# Patient Record
Sex: FEMALE | Race: AMERICAN INDIAN OR ALASKA NATIVE
[De-identification: names, ages, dates, MRNs, and addresses within clinical notes are randomized per-mention and may not be internally consistent; named-entity substitution may affect disease eponyms.]

---

## 2019-02-19 ENCOUNTER — HOSPITAL ENCOUNTER (OUTPATIENT)
Dept: HOSPITAL 5 - GIO | Age: 49
Discharge: HOME | End: 2019-02-19
Attending: INTERNAL MEDICINE
Payer: COMMERCIAL

## 2019-02-19 VITALS — DIASTOLIC BLOOD PRESSURE: 69 MMHG | SYSTOLIC BLOOD PRESSURE: 123 MMHG

## 2019-02-19 DIAGNOSIS — I10: ICD-10-CM

## 2019-02-19 DIAGNOSIS — Z86.2: ICD-10-CM

## 2019-02-19 DIAGNOSIS — Z86.010: ICD-10-CM

## 2019-02-19 DIAGNOSIS — Z88.0: ICD-10-CM

## 2019-02-19 DIAGNOSIS — Z90.710: ICD-10-CM

## 2019-02-19 DIAGNOSIS — K21.9: ICD-10-CM

## 2019-02-19 DIAGNOSIS — Z88.5: ICD-10-CM

## 2019-02-19 DIAGNOSIS — Z98.51: ICD-10-CM

## 2019-02-19 DIAGNOSIS — Z98.890: ICD-10-CM

## 2019-02-19 DIAGNOSIS — Z79.899: ICD-10-CM

## 2019-02-19 DIAGNOSIS — Z12.11: Primary | ICD-10-CM

## 2019-02-19 DIAGNOSIS — Z98.891: ICD-10-CM

## 2019-02-19 DIAGNOSIS — Z72.89: ICD-10-CM

## 2019-02-19 PROCEDURE — 45378 DIAGNOSTIC COLONOSCOPY: CPT

## 2019-02-19 NOTE — SHORT STAY SUMMARY
Short Stay Documentation


Date of service: 02/19/19


Narrative H&P: 





The patient presents for screening colonoscopy, FH positive for a first degree 

relative with colon polyps.  No prior studies.





- History


Past Medical History: hypertension


Past Surgical History: hysterectomy


Social history: no significant social history, , no smoking, no alcohol 

abuse





- Allergies and Medications


Current Medications: 


                                    Allergies





codeine Allergy (Verified 07/24/14 15:03)


   nausea and vomiting


Penicillins Allergy (Verified 07/24/14 15:03)


   rash, swelling, itching





                                Home Medications











 Medication  Instructions  Recorded  Confirmed  Last Taken  Type


 


Cinnamon 500 mg PO DAILY 02/18/19 02/18/19 Unknown History


 


Fish Oil 1 tab PO DAILY 02/18/19 02/18/19 Unknown History


 


Triamter/Hctz 37.5-25 mg 1 tab PO DAILY 02/18/19 02/19/19 02/19/19 History








Active Medications





Sodium Chloride (Nacl 0.9% 1000 Ml)  1,000 mls @ 50 mls/hr IV AS DIRECT LUCY


   Last Admin: 02/19/19 07:58 Dose:  50 mls/hr


   Documented by: 











- Physical exam


General appearance: no acute distress, well-nourished


Integumentary: no rash, no growths, no abnormal pigmentation


HEENT: Atraumatic, PERRLA, EOMI, Mucous membr. moist/pink


Lungs: Clear to auscultation, Normal air movement


Breasts: deferred


Heart: Regular rate, Normal S1, Normal S2, No murmurs


Gastrointestinal: normoactive bowel sounds, no tenderness, no distended, no 

masses, no guarding, no organomegaly


Female Genitourinary: deferred


Rectal Exam: normal exam-external/orifice, normal rectal tone, no mass


Extremities: no ischemia, pulses intact, pulses symmetrical, No edema, normal 

temperature, normal color, Full ROM


Neurological: Normal gait, Normal speech, Strength at 5/5 X4 ext, Normal tone, 

Sensation intact, Cranial nerves 3-12 NL





- Brief post op/procedure progress note


Date of procedure: 02/19/19


Findings: 





see dictated report


Estimated blood loss: none


Pathology: none


Condition: stable





- Disposition


Condition at discharge: Good


Disposition: DC-01 TO HOME OR SELFCARE





- Discharge Diagnoses


(1) Colon cancer screening


Status: Acute   





Short Stay Discharge Plan


Activity: other (no driving for 24 hours)


Weight Bearing Status: Weight Bear as Tolerated


Diet: regular


Follow up with: 


SOY AGUSTIN JR, MD [Primary Care Provider] - 7 Days

## 2019-02-19 NOTE — OPERATIVE REPORT
Operative Report


Operative Report: 





Date of procedure: 02/19/2019





Preprocedure diagnosis: History of colon polyps and a first-degree member.  No 

prior studies.





Post procedure diagnosis: Normal colon to the cecum





Procedure: Colonoscopy to the cecum





Endoscopist: Dr. Munoz





Anesthesia: Monitored anesthesia care per anesthesia department





Estimated blood loss: 0





Medications: Monitored anesthesia care.  See separate report by anesthesia for 

details.





After careful discussion of the nature and purpose of the procedure as well as 

details of the technique risks benefits and alternatives the patient gave 

consent.  Please see recent history and physical from the office.  The patient 

was placed in the left lateral decubitus position and medicated per anesthesia. 

A rectal exam was performed sphincter tone was normal there were no masses 

palpable.





The KeTechn 570 scope was passed transanally and advanced under continuous 

direct vision without difficulty to the cecum.  The colon was well prepared.  

The cecum was normal.  The ascending colon was normal and on forward and 

retroflexed views.  The transverse colon, descending colon, and sigmoid colon 

were normal.  The rectum was normal on forward and retroflexed views.  The 

procedure was well-tolerated overall and the patient was observed in recovery.





Conclusions: Normal colonoscopy to the cecum.





Plan: Repeat colonoscopy in 5 years based on family history.








Signed electronically: Kirill Munoz M.D.

## 2019-02-19 NOTE — ANESTHESIA CONSULTATION
Anesthesia Consult and Med Hx


Date of service: 02/19/19





- Airway


Anesthetic Teeth Evaluation: Good, Caps, Crowns


ROM Head & Neck: Adequate


Mental/Hyoid Distance: Adequate


Mallampati Class: Class I


Intubation Access Assessment: Good





- Pulmonary Exam


CTA: Yes





- Cardiac Exam


Cardiac Exam: RRR





- Pre-Operative Health Status


ASA Pre-Surgery Classification: ASA2


Proposed Anesthetic Plan: TIVA





- Pulmonary


Hx Smoking: No


Hx Asthma: No


Hx Respiratory Symptoms: No


SOB: No


COPD: No


Home Oxygen Therapy: No


Hx Pneumonia: No


Hx Sleep Apnea: No





- Cardiovascular System


Hx Hypertension: Yes (on medications, took meds today)


Hx Coronary Artery Disease: No


Hx Heart Attack/AMI: No


Hx Angina: No


Hx Percutaneous Transluminal Coronary Angioplasty (PTCA): No


Hx Cardia Arrhythmia: No


Hx Pacemaker: No


Hx Internal Defibrillator: No


Hx Valvular Heart Disease: No


Hx Heart Murmur: No


Hx Peripheral Vascular Disease: No





- Central Nervous System


Hx Neuromuscular Disorder: No


Hx Seizures: No


CVA: No


Hx Back Pain: No


Hx Psychiatric Problems: No





- Gastrointestinal


Hx Ulcer: No


Hx Gastroesophageal Reflux Disease: No





- Endocrine


Hx Renal Disease: No


Hx End Stage Renal Disease: No


Hx Cirrhosis: No


Hx Liver Disease: No


Hx Insulin Dependent Diabetes: No


Hx Non-Insulin Dependent Diabetes: No


Hx Thyroid Disease: No


Hx Hypothyroidism: No


Hx Hyperthyroidism: No





- Hematic


Hx Anemia: Yes (resolved)


Hx Sickle Cell Disease: No





- Other Systems


Hx Alcohol Use: Yes (2-4 drinks per month)


Hx Substance Use: No


Hx Cancer: No


Hx Obesity: No

## 2019-02-19 NOTE — ANESTHESIA DAY OF SURGERY
Anesthesia Day of Surgery





- Day of Surgery


Patient Examined: Yes


Patient H&P Reviewed: Yes


Patient is NPO: Yes


Beta Blockers: No


Cardiac Clearance: No


Pulmonary Clearance: No


Toni's Test: Negative

## 2020-10-13 ENCOUNTER — HOSPITAL ENCOUNTER (OUTPATIENT)
Dept: HOSPITAL 5 - MAMMO | Age: 50
Discharge: HOME | End: 2020-10-13
Attending: FAMILY MEDICINE
Payer: COMMERCIAL

## 2020-10-13 DIAGNOSIS — R92.8: Primary | ICD-10-CM

## 2020-10-13 PROCEDURE — 77066 DX MAMMO INCL CAD BI: CPT

## 2020-10-13 NOTE — MAMMOGRAPHY REPORT
BILATERAL DIGITAL DIAGNOSTIC MAMMOGRAM WITH CAD 10/13/2020

RIGHT LIMITED BREAST ULTRASOUND

 

INDICATION: The patient reports a lump in the right breast for 3 months.



TECHNIQUE:  Digital bilateral mammographic imaging was performed. Spot compression views were obtaine
d. Limited ultrasound was performed. This examination was interpreted with the benefit of Computer-
ded Detection (CAD) analysis. 



COMPARISON: Screening mammogram, 1/30/2013



FINDINGS: 



Breast Density: The breasts are heterogeneously dense, which may obscure small masses.



MAMMOGRAPHIC FINDINGS:



Right breast: There is a round mass with indistinct margins and associated calcifications in the righ
t breast at the 1:00 position posterior depth. This measures approximately 2.3 cm. This corresponds t
o the patient's area of palpable concern.



Left breast: There is no evidence of dominant mass, suspicious calcifications or architectural distor
tion in the left breast.



ULTRASOUND FINDINGS: Targeted ultrasound evaluation was performed of the area of interest.   Sonograp
hic evaluation of the right breast at the 1:00 position in the patient's area of palpable concern dem
onstrates an irregular hypoechoic solid mass measuring 2.5 x 1.6 cm. There is no significant associat
ed vascularity. This corresponds to the mammographic findings and to the patient's area of clinical c
oncern.



Sonographic evaluation of the right axillary region demonstrates no pathologically enlarged axillary 
lymph nodes.



IMPRESSION:



Follow up recommendation: Surgical consult



BI-RADS Category 5: Highly Suggestive of Malignancy.  The palpable right breast mass at the 1:00 posi
tion is highly suggestive of malignancy. Surgical consultation and ultrasound-guided biopsy is recomm
ended.





A "normal" or negative report should not discourage follow up or biopsy of a clinically significant f
inding.



A written summary of these findings will be mailed to the patient. The patient will be entered into a
 mammography reporting system which will generate a reminder letter for the patient's next appointmen
t at the appropriate interval.



According to the [employer], yearly mammograms are recommended starting at age 40 
and continuing as long as a woman is in good health.  Breast MRI is recommended for women with an david
roximately 20-25% or greater lifetime risk of breast cancer, including women with a strong family his
tory of breast or ovarian cancer and women who have been treated for Hodgkin's disease.



Signer Name: Latha Alcala MD 

Signed: 10/13/2020 4:23 PM

Workstation Name: [id number]

## 2020-10-13 NOTE — ULTRASOUND REPORT
BILATERAL DIGITAL DIAGNOSTIC MAMMOGRAM WITH CAD 10/13/2020

RIGHT LIMITED BREAST ULTRASOUND

 

INDICATION: The patient reports a lump in the right breast for 3 months.



TECHNIQUE:  Digital bilateral mammographic imaging was performed. Spot compression views were obtaine
d. Limited ultrasound was performed. This examination was interpreted with the benefit of Computer-
ded Detection (CAD) analysis. 



COMPARISON: Screening mammogram, 1/30/2013



FINDINGS: 



Breast Density: The breasts are heterogeneously dense, which may obscure small masses.



MAMMOGRAPHIC FINDINGS:



Right breast: There is a round mass with indistinct margins and associated calcifications in the righ
t breast at the 1:00 position posterior depth. This measures approximately 2.3 cm. This corresponds t
o the patient's area of palpable concern.



Left breast: There is no evidence of dominant mass, suspicious calcifications or architectural distor
tion in the left breast.



ULTRASOUND FINDINGS: Targeted ultrasound evaluation was performed of the area of interest.   Sonograp
hic evaluation of the right breast at the 1:00 position in the patient's area of palpable concern dem
onstrates an irregular hypoechoic solid mass measuring 2.5 x 1.6 cm. There is no significant associat
ed vascularity. This corresponds to the mammographic findings and to the patient's area of clinical c
oncern.



Sonographic evaluation of the right axillary region demonstrates no pathologically enlarged axillary 
lymph nodes.



IMPRESSION:



Follow up recommendation: Surgical consult



BI-RADS Category 5: Highly Suggestive of Malignancy.  The palpable right breast mass at the 1:00 posi
tion is highly suggestive of malignancy. Surgical consultation and ultrasound-guided biopsy is recomm
ended.





A "normal" or negative report should not discourage follow up or biopsy of a clinically significant f
inding.



A written summary of these findings will be mailed to the patient. The patient will be entered into a
 mammography reporting system which will generate a reminder letter for the patient's next appointmen
t at the appropriate interval.



According to the [employer], yearly mammograms are recommended starting at age 40 
and continuing as long as a woman is in good health.  Breast MRI is recommended for women with an david
roximately 20-25% or greater lifetime risk of breast cancer, including women with a strong family his
tory of breast or ovarian cancer and women who have been treated for Hodgkin's disease.



Signer Name: Latha Alcala MD 

Signed: 10/13/2020 4:23 PM

Workstation Name: [id number]

## 2020-11-04 ENCOUNTER — HOSPITAL ENCOUNTER (OUTPATIENT)
Dept: HOSPITAL 5 - LAB | Age: 50
Discharge: HOME | End: 2020-11-04
Attending: FAMILY MEDICINE
Payer: COMMERCIAL

## 2020-11-04 DIAGNOSIS — C50.211: Primary | ICD-10-CM

## 2020-11-04 LAB
ALBUMIN SERPL-MCNC: 4.5 G/DL (ref 3.9–5)
ALT SERPL-CCNC: 25 UNITS/L (ref 7–56)
BACTERIA #/AREA URNS HPF: (no result) /HPF
BASOPHILS # (AUTO): 0 K/MM3 (ref 0–0.1)
BASOPHILS NFR BLD AUTO: 0.9 % (ref 0–1.8)
BILIRUB UR QL STRIP: (no result)
BLOOD UR QL VISUAL: (no result)
BUN SERPL-MCNC: 14 MG/DL (ref 7–17)
BUN/CREAT SERPL: 16 %
CALCIUM SERPL-MCNC: 9.5 MG/DL (ref 8.4–10.2)
EOSINOPHIL # BLD AUTO: 0.1 K/MM3 (ref 0–0.4)
EOSINOPHIL NFR BLD AUTO: 1.3 % (ref 0–4.3)
HCT VFR BLD CALC: 38.8 % (ref 30.3–42.9)
HDLC SERPL-MCNC: 69 MG/DL (ref 40–59)
HEMOLYSIS INDEX: 4
HGB BLD-MCNC: 12.9 GM/DL (ref 10.1–14.3)
LYMPHOCYTES # BLD AUTO: 1.4 K/MM3 (ref 1.2–5.4)
LYMPHOCYTES NFR BLD AUTO: 33.5 % (ref 13.4–35)
MCHC RBC AUTO-ENTMCNC: 33 % (ref 30–34)
MCV RBC AUTO: 88 FL (ref 79–97)
MONOCYTES # (AUTO): 0.3 K/MM3 (ref 0–0.8)
MONOCYTES % (AUTO): 7 % (ref 0–7.3)
MUCOUS THREADS #/AREA URNS HPF: (no result) /HPF
PH UR STRIP: 7 [PH] (ref 5–7)
PLATELET # BLD: 228 K/MM3 (ref 140–440)
PROT UR STRIP-MCNC: (no result) MG/DL
RBC # BLD AUTO: 4.4 M/MM3 (ref 3.65–5.03)
RBC #/AREA URNS HPF: 6 /HPF (ref 0–6)
URATE SERPL-MCNC: 5.6 MG/DL (ref 3.5–7.6)
UROBILINOGEN UR-MCNC: < 2 MG/DL (ref ?–2)
WBC #/AREA URNS HPF: 2 /HPF (ref 0–6)

## 2020-11-04 PROCEDURE — 83735 ASSAY OF MAGNESIUM: CPT

## 2020-11-04 PROCEDURE — 84100 ASSAY OF PHOSPHORUS: CPT

## 2020-11-04 PROCEDURE — 82607 VITAMIN B-12: CPT

## 2020-11-04 PROCEDURE — 85025 COMPLETE CBC W/AUTO DIFF WBC: CPT

## 2020-11-04 PROCEDURE — 80053 COMPREHEN METABOLIC PANEL: CPT

## 2020-11-04 PROCEDURE — 84550 ASSAY OF BLOOD/URIC ACID: CPT

## 2020-11-04 PROCEDURE — 82306 VITAMIN D 25 HYDROXY: CPT

## 2020-11-04 PROCEDURE — 80061 LIPID PANEL: CPT

## 2020-11-04 PROCEDURE — 36415 COLL VENOUS BLD VENIPUNCTURE: CPT

## 2020-11-04 PROCEDURE — 83036 HEMOGLOBIN GLYCOSYLATED A1C: CPT

## 2020-11-04 PROCEDURE — 81001 URINALYSIS AUTO W/SCOPE: CPT

## 2020-11-06 ENCOUNTER — HOSPITAL ENCOUNTER (OUTPATIENT)
Dept: HOSPITAL 5 - SPVWC | Age: 50
Discharge: HOME | End: 2020-11-06
Attending: SURGERY
Payer: COMMERCIAL

## 2020-11-06 ENCOUNTER — HOSPITAL ENCOUNTER (OUTPATIENT)
Dept: HOSPITAL 5 - LABHHL | Age: 50
Discharge: HOME | End: 2020-11-06
Attending: SURGERY
Payer: COMMERCIAL

## 2020-11-06 DIAGNOSIS — R92.8: Primary | ICD-10-CM

## 2020-11-06 DIAGNOSIS — N63.11: Primary | ICD-10-CM

## 2020-11-06 PROCEDURE — 88341 IMHCHEM/IMCYTCHM EA ADD ANTB: CPT

## 2020-11-06 PROCEDURE — 88305 TISSUE EXAM BY PATHOLOGIST: CPT

## 2020-11-06 PROCEDURE — 88342 IMHCHEM/IMCYTCHM 1ST ANTB: CPT

## 2020-11-06 NOTE — MAMMOGRAPHY REPORT
DIGITAL DIAGNOSTIC MAMMOGRAM WITH CAD CONVENTIONAL, 11/6/2020



CLINICAL INFORMATION / INDICATION: DOCUMENT RT BRST CLIP



TECHNIQUE:  Digital right mammographic imaging was performed.

This examination was interpreted with the benefit of Computer-aided Detection analysis. 



COMPARISON: 10/13/2020



FINDINGS: 



Breast Density: The breasts are heterogeneously dense, which may obscure small masses.



Mass in the right breast is again noted. Biopsy marking clip is located approximately 1 cm anterior t
o the mass.







IMPRESSION: Biopsy marking clip is located approximately 1 cm anterior to the mass in the 1:00 positi
on of the right breast.







Post biopsy imaging.



-------------------------------------------------------------------------------------------

A "normal" or negative report should not discourage follow up or biopsy of a clinically significant f
inding.



A written summary of these findings will be mailed to the patient. The patient will be entered into a
 mammography reporting system which will generate a reminder letter for the patient's next appointmen
t at the appropriate interval.



According to the [employer], yearly mammograms are recommended starting at age 40 
and continuing as long as a woman is in good health.  Breast MRI is recommended for women with an david
roximately 20-25% or greater lifetime risk of breast cancer, including women with a strong family his
tory of breast or ovarian cancer and women who have been treated for Hodgkin's disease.



Signer Name: Rogelio Claros MD 

Signed: 11/6/2020 11:20 AM

Workstation Name: [email or web address]

## 2020-11-08 LAB — VITAMIN D2 SERPL-MCNC: <4 NG/ML

## 2020-11-25 ENCOUNTER — HOSPITAL ENCOUNTER (OUTPATIENT)
Dept: HOSPITAL 5 - SPVIMAG | Age: 50
Discharge: HOME | End: 2020-11-25
Attending: SURGERY
Payer: COMMERCIAL

## 2020-11-25 DIAGNOSIS — K76.9: ICD-10-CM

## 2020-11-25 DIAGNOSIS — C50.211: Primary | ICD-10-CM

## 2020-11-25 PROCEDURE — 77049 MRI BREAST C-+ W/CAD BI: CPT

## 2020-11-25 PROCEDURE — C8908 MRI W/O FOL W/CONT, BREAST,: HCPCS

## 2020-11-25 PROCEDURE — A9577 INJ MULTIHANCE: HCPCS

## 2020-11-25 NOTE — MAGNETIC RESONANCE REPORT
Bilateral breast MRI with and without contrast.



History:  New diagnosis right breast cancer



Procedure: Axial T1 and T2-weighted fat-sat images were obtained precontrast.  17 cc MultiHance was i
njected intravenously and serial axial T1-weighted images with fat saturation were obtained postcontr
ast. 3-D MIP projections, Kinetic analysis and subtraction imaging was utilized to evaluate. A Ridejoy 8 channel breast coil was utilized for image acquisition.



Comparison: Right breast ultrasound 10/13/2020, right mammogram 11/6/2020, bilateral mammogram 10/13/
2020



Findings: Background level of enhancement is marked.



No suspicious axillary or clavicular nodes are identified. No abnormal bone marrow signal is seen. No
 significant chest wall enhancement is noted. 



On coronal STIR series in the upper medial posterior aspect of the right lobe of the liver, a mildly 
irregular moderately increased signal 17 mm lesion is seen which could be a cyst but is considered in
determinate. This is not visible on axial imaging.



Right breast: The known malignant mass in the upper posterior right breast at approximately 12:30 is 
seen with prominently abnormal enhancement. This mass measures 2.9 cm in greatest diameter. The lesio
n is approximately 7.2 cm from the nipple, 1.8 cm from the medial skin surface, and 2.1 cm from the c
hest wall. The dense surrounding breast tissue in the mid to posterior upper medial left breast shows
 prominent proliferative change. Heterogenous kinetics are seen and in this background would be diffi
cult to exclude additional neoplasia. This region measures in AP diameter of approximate 7 cm and ext
ends from the anterior to mid breast nearly to the chest wall. Similar appearance is seen but to a mu
ch lesser degree laterally in the upper mid to posterior right breast within the dense breast tissue.
 This is more pronounced though somewhat similar to analogous tissue on the left. Moderate stippled e
nhancement is seen in other areas. In the lower outer right breast a band of enhancing tissue is seen
 similar to findings more superiorly with mildly heterogenous kinetics.



Left breast: Benign-appearing nodule with a biopsy clip is seen mammographically is noted in the ante
rior upper medial left breast with only slight enhancement. Prominent proliferative changes are seen 
with prominent stippled enhancement throughout. In the upper outer mid to posterior left breast a ban
d of tissue shows relatively more enhancement with mild washout which is diffuse and similar appearan
ce to several areas in the outer portion of the right breast.





Impression: 

1. Known malignant mass is seen in the right breast

2. Marked bilateral proliferative changes make evaluation of the remainder of the breast tissue extre
rolo difficult in this patient. This appears as both diffuse generalized tissue enhancement as well a
s stippled enhancement, the latter more prominent on the left than right. In the right breast the den
se tissue surrounding the known malignant mass shows relatively more enhancement including heterogeno
us kinetics but there are similar but smaller areas seen in several regions of the lateral aspect of 
the right breast as well as the upper outer posterior portion of the left breast. It would be difficu
lt to exclude neoplastic type activity in these areas though I believe this is more likely part of a 
diffuse proliferative process which is particularly prominent in this patient.

3. Indeterminate lesion in the liver. Ultrasound may be useful.



BIRADS: 6: Known diagnosis breast cancer



Signer Name: Moo Benoit MD 

Signed: 11/25/2020 4:32 PM

Workstation Name: [id number]

## 2020-12-07 ENCOUNTER — HOSPITAL ENCOUNTER (OUTPATIENT)
Dept: HOSPITAL 5 - US | Age: 50
Discharge: HOME | End: 2020-12-07
Attending: SURGERY
Payer: COMMERCIAL

## 2020-12-07 DIAGNOSIS — C50.211: Primary | ICD-10-CM

## 2020-12-07 DIAGNOSIS — N63.42: ICD-10-CM

## 2020-12-07 DIAGNOSIS — K76.89: ICD-10-CM

## 2020-12-07 PROCEDURE — 76700 US EXAM ABDOM COMPLETE: CPT

## 2020-12-07 NOTE — ULTRASOUND REPORT
ULTRASOUND BREAST BILATERAL COMPLETE, 12/7/2020



CLINICAL INFORMATION / INDICATION: Patient recently diagnosed with right breast malignancy. Recent MR
I showed bilateral abnormal MRI findings..



TECHNIQUE: Complete sonographic evaluation of all 4 quadrants and retroareolar region was performed. 
 



COMPARISON: Recent breast MRI 11/25/2020, prior mammogram 10/13/2020 and prior right breast ultrasoun
d 10/13/2020



FINDINGS: 

Left breast: Sonographic evaluation the entire left breast demonstrates a hypoechoic mass measuring 1
3 mm at 12:00. This mass contains a biopsy clip and has been previously biopsied. The remainder of th
e left breast is unremarkable.



Right breast: In the 12-1:00 position of the right breast, there is an irregular solid hypoechoic mas
s measuring 3.5 x 1.9 x 3.2 cm. This is the patient's biopsy-proven breast malignancy. This mass does
 appear to have increased in size slightly since prior ultrasound dated 10/13/2020.

No additional findings identified in the right breast.



IMPRESSION: 

1. Biopsy-proven right breast malignancy is again noted. There has been slight interval growth since 
10/13/2020 ultrasound exam.

2. Biopsy proven benign left breast mass at 12:00.

3. No other additional findings are seen within either breast.



Follow up recommendation: Continued surgical consultation for biopsy proven right breast malignancy.









-------------------------------------------------------------------------------------------

A normal or "negative" report should not preclude biopsy or follow-up of a clinically suspicious find
ing.



Signer Name: Mckenzie Baez MD 

Signed: 12/7/2020 4:48 PM

Workstation Name: [email or web address]

## 2020-12-07 NOTE — ULTRASOUND REPORT
ULTRASOUND ABDOMEN, COMPLETE



INDICATION:

ATTENTION TO LIVER.



COMPARISON:

No relevant prior imaging study available. 



FINDINGS:

Pancreas: No significant abnormality.

Abdominal Aorta: No significant abnormality. IVC: No significant abnormality.

Liver: The liver measures 12.8 cm in length.  There are 2 or 3 small cysts scattered throughout the l
iver with the largest measuring 1.1 cm. Normal hepatopedal blood flow in the main portal vein.

Gallbladder: No significant abnormality. 

Bile ducts: No significant abnormality. Common bile duct measures 3 mm. 

Kidneys: Right:  9.7 cm in length.  No significant abnormality. Left:  9.8 cm in length.  No signific
ant abnormality.

Spleen: No significant abnormality.



Free fluid: None.

Additional Findings: None.



IMPRESSION:

No significant abnormality. Few small liver cysts as described. 



Signer Name: Lakhwinder Sparrow Jr, MD 

Signed: 12/7/2020 2:45 PM

Workstation Name: [id number]

## 2020-12-16 ENCOUNTER — HOSPITAL ENCOUNTER (OUTPATIENT)
Dept: HOSPITAL 5 - SPVWC | Age: 50
Discharge: HOME | End: 2020-12-16
Attending: SURGERY
Payer: COMMERCIAL

## 2020-12-16 DIAGNOSIS — Z88.0: ICD-10-CM

## 2020-12-16 DIAGNOSIS — Z98.891: ICD-10-CM

## 2020-12-16 DIAGNOSIS — Z86.2: ICD-10-CM

## 2020-12-16 DIAGNOSIS — Z88.5: ICD-10-CM

## 2020-12-16 DIAGNOSIS — Z72.89: ICD-10-CM

## 2020-12-16 DIAGNOSIS — Z98.51: ICD-10-CM

## 2020-12-16 DIAGNOSIS — K21.9: ICD-10-CM

## 2020-12-16 DIAGNOSIS — I10: ICD-10-CM

## 2020-12-16 DIAGNOSIS — Z98.890: ICD-10-CM

## 2020-12-16 DIAGNOSIS — Z90.710: ICD-10-CM

## 2020-12-16 DIAGNOSIS — R59.9: Primary | ICD-10-CM

## 2020-12-16 DIAGNOSIS — Z79.899: ICD-10-CM

## 2020-12-16 PROCEDURE — 38505 NEEDLE BIOPSY LYMPH NODES: CPT

## 2020-12-16 PROCEDURE — 88305 TISSUE EXAM BY PATHOLOGIST: CPT

## 2020-12-16 PROCEDURE — 76942 ECHO GUIDE FOR BIOPSY: CPT

## 2020-12-16 NOTE — ULTRASOUND REPORT
ULTRASOUND-GUIDED CORE NEEDLE BIOPSY RIGHT AXILLA WITH CLIP PLACEMENT



INDICATION: Mildly abnormal right axillary lymph node in the setting of known right breast cancer.



FINDINGS:



Informed consent was obtained. The abnormal lymph node within the right axilla was identified with ul
trasound. The overlying skin was cleansed with chloro prep and local anesthesia was obtained with a 1
% lidocaine solution. Under ultrasound guidance a 14-gauge spring loaded core biopsy needle was advan
dylon to the lesion. A total of 4 core samples were obtained. A U-shaped biopsy marker was placed to ma
rk the site of the biopsy. Specimen samples were placed in formalin and sent to pathology for analysi
s.



Patient tolerated the procedure well and no immediate complications were identified. A post procedure
 mammogram was not obtained, but the biopsy marker appears appropriately positioned under ultrasound.




IMPRESSION: 



Technically successful ultrasound-guided core biopsy of right axillary lymph node with placement of a
 U-shaped biopsy marker.



An addendum will be added to this report once pathology results are available.



Signer Name: Jonathan Seo MD 

Signed: 12/16/2020 3:06 PM

Workstation Name: [id number]

## 2020-12-16 NOTE — ULTRASOUND REPORT
RIGHT AXILLARY ULTRASOUND

 

INDICATION: Patient with known biopsy-proven malignancy within the right breast. 



COMPARISON: 12/7/2020, 11/25/2020, 11/6/2020, 10/13/2020.



FINDINGS: A targeted ultrasound of the right axilla was performed. There is a lymph node which demons
trates mild cortical thickening measuring up to 3.8 mm. Preserved fatty hilum is noted.



IMPRESSION:



Mildly abnormal right axillary lymph node. Given the presence of known right breast malignancy, an ul
trasound guided biopsy of this lymph node is planned for later this same day.



BI-RADS Category 4: Suspicious for Malignancy. 







Signer Name: Jonathan Seo MD 

Signed: 12/16/2020 2:11 PM

Workstation Name: [id number]

## 2020-12-23 ENCOUNTER — HOSPITAL ENCOUNTER (OUTPATIENT)
Dept: HOSPITAL 5 - OR | Age: 50
Discharge: HOME | End: 2020-12-23
Attending: SURGERY
Payer: COMMERCIAL

## 2020-12-23 VITALS — SYSTOLIC BLOOD PRESSURE: 122 MMHG | DIASTOLIC BLOOD PRESSURE: 87 MMHG

## 2020-12-23 DIAGNOSIS — Z86.2: ICD-10-CM

## 2020-12-23 DIAGNOSIS — Z79.899: ICD-10-CM

## 2020-12-23 DIAGNOSIS — Z17.1: ICD-10-CM

## 2020-12-23 DIAGNOSIS — Z20.828: ICD-10-CM

## 2020-12-23 DIAGNOSIS — Z72.89: ICD-10-CM

## 2020-12-23 DIAGNOSIS — I89.8: ICD-10-CM

## 2020-12-23 DIAGNOSIS — I10: ICD-10-CM

## 2020-12-23 DIAGNOSIS — Z88.5: ICD-10-CM

## 2020-12-23 DIAGNOSIS — E78.00: ICD-10-CM

## 2020-12-23 DIAGNOSIS — Z98.51: ICD-10-CM

## 2020-12-23 DIAGNOSIS — C50.211: Primary | ICD-10-CM

## 2020-12-23 DIAGNOSIS — Z98.890: ICD-10-CM

## 2020-12-23 DIAGNOSIS — Z98.891: ICD-10-CM

## 2020-12-23 DIAGNOSIS — Z88.0: ICD-10-CM

## 2020-12-23 DIAGNOSIS — F41.9: ICD-10-CM

## 2020-12-23 DIAGNOSIS — E66.9: ICD-10-CM

## 2020-12-23 DIAGNOSIS — Z90.710: ICD-10-CM

## 2020-12-23 PROCEDURE — 64450 NJX AA&/STRD OTHER PN/BRANCH: CPT

## 2020-12-23 PROCEDURE — 38792 RA TRACER ID OF SENTINL NODE: CPT

## 2020-12-23 PROCEDURE — 78800 RP LOCLZJ TUM 1 AREA 1 D IMG: CPT

## 2020-12-23 PROCEDURE — A9541 TC99M SULFUR COLLOID: HCPCS

## 2020-12-23 PROCEDURE — 88333 PATH CONSLTJ SURG CYTO XM 1: CPT

## 2020-12-23 PROCEDURE — 88342 IMHCHEM/IMCYTCHM 1ST ANTB: CPT

## 2020-12-23 PROCEDURE — 76098 X-RAY EXAM SURGICAL SPECIMEN: CPT

## 2020-12-23 PROCEDURE — A4648 IMPLANTABLE TISSUE MARKER: HCPCS

## 2020-12-23 PROCEDURE — 38525 BIOPSY/REMOVAL LYMPH NODES: CPT

## 2020-12-23 PROCEDURE — 19301 PARTIAL MASTECTOMY: CPT

## 2020-12-23 PROCEDURE — 88341 IMHCHEM/IMCYTCHM EA ADD ANTB: CPT

## 2020-12-23 PROCEDURE — 88307 TISSUE EXAM BY PATHOLOGIST: CPT

## 2020-12-23 PROCEDURE — U0003 INFECTIOUS AGENT DETECTION BY NUCLEIC ACID (DNA OR RNA); SEVERE ACUTE RESPIRATORY SYNDROME CORONAVIRUS 2 (SARS-COV-2) (CORONAVIRUS DISEASE [COVID-19]), AMPLIFIED PROBE TECHNIQUE, MAKING USE OF HIGH THROUGHPUT TECHNOLOGIES AS DESCRIBED BY CMS-2020-01-R: HCPCS

## 2020-12-23 NOTE — SHORT STAY SUMMARY
Short Stay Documentation


Date of service: 12/23/20





- History


H&P: obtained from office





- Allergies and Medications


Current Medications: 


                                    Allergies





codeine Allergy (Verified 12/17/20 08:02)


   nausea and vomiting


Penicillins Allergy (Verified 12/17/20 08:02)


   rash, swelling, itching





                                Home Medications











 Medication  Instructions  Recorded  Confirmed  Last Taken  Type


 


Triamter/Hctz 37.5-25 mg 1 tab PO DAILY 02/18/19 12/17/20 02/19/19 History


 


Lactobacillus Combination No.8 1 each PO DAILY 12/17/20 12/17/20 Unknown History





[Adult Probiotic]     








Active Medications





Acetaminophen (Acetaminophen 500 Mg Tab)  1,000 mg PO PREOP LUCY


   Stop: 12/23/20 23:59


   Last Admin: 12/23/20 06:40 Dose:  1,000 mg


   Documented by: 


Celecoxib (Celecoxib 200 Mg Cap)  200 mg PO PREOP NR


   Stop: 12/23/20 23:59


   Last Admin: 12/23/20 06:40 Dose:  200 mg


   Documented by: 


Fentanyl (Fentanyl 100 Mcg/2 Ml Inj)  100 mcg IV ONCE PRN


   PRN Reason: sedation for nerve block


   Stop: 12/23/20 23:59


   Last Admin: 12/23/20 07:38 Dose:  100 mcg


   Documented by: 


Fentanyl (Fentanyl 100 Mcg/2 Ml Inj)  50 mcg IV Q5MIN PRN


   PRN Reason: Pain , Severe (7-10)


   Stop: 12/23/20 17:00


Gabapentin (Gabapentin 300 Mg Cap)  300 mg PO PREOP NR


   Stop: 12/23/20 23:59


   Last Admin: 12/23/20 06:40 Dose:  300 mg


   Documented by: 


Lactated Ringer's (Lactated Ringers)  1,000 mls @ 100 mls/hr IV AS DIRECT LUCY


   Stop: 12/23/20 23:59


   Last Admin: 12/23/20 06:40 Dose:  100 mls/hr


   Documented by: 


Vancomycin HCl 1,250 mg/ (Sodium Chloride)  275 mls @ 166.667 mls/hr IV PREOP 

LUCY


   Stop: 12/23/20 16:00


   Last Admin: 12/23/20 07:50 Dose:  166.667 mls/hr


   Documented by: 


Midazolam HCl (Midazolam 2 Mg/2 Ml Inj)  2 mg IV PREOP NR


   Stop: 12/23/20 23:59


   Last Admin: 12/23/20 07:38 Dose:  2 mg


   Documented by: 


Ondansetron HCl (Ondansetron 4 Mg/2 Ml Inj)  4 mg IV ONCE PRN


   PRN Reason: Nausea And Vomiting


   Stop: 12/23/20 16:00


Scopolamine (Scopolamine Transdermal Patch 72 Hr)  1 each TD PREOP NR


   Stop: 12/27/20 05:59


   Last Admin: 12/23/20 06:40 Dose:  1 each


   Documented by: 











- Brief post op/procedure progress note


Date of procedure: 12/23/20


Pre-op diagnosis: Right breast cancer upper inner quadrant


Post-op diagnosis: same


Procedure: 





Right partial mastectomy and sentinel lymph node biopsy


Anesthesia: GETA


Findings: 





Right partial mastectomy with clip and mass present; x2 SLNS


Surgeon: HAILEY PRINGLE


Estimated blood loss: other (50 cc)


Pathology: list


Specimen disposition: to lab


Condition: stable





- Disposition


Condition at discharge: Good


Disposition: DC-01 TO HOME OR SELFCARE





Short Stay Discharge Plan


Activity: other (no heavy lifting)


Diet: regular


Wound: keep clean and dry (wear breast binder; may shower in 48 hours; no baths,

pools or lakes)


Follow up with: 


SOY AGUSTIN JR, MD [Primary Care Provider] - 7 Days


HAILEY PRINGLE MD [Staff Physician] - 7 Days


Prescriptions: 


Sulfamethoxazole/Trimethoprim [Bactrim DS TAB] 1 each PO BID #14 tablet

## 2020-12-23 NOTE — OPERATIVE REPORT
Operative Report


Operative Report: 





Operative Report: 





December 23, 2020





Preoperative diagnosis: Right breast cancer of the upper inner quadrant





Postoperative diagnosis: Same





Procedure: Right breast partial mastectomy of the upper inner quadrant with SLNB

and placement of BioZorb marker





Surgeon: Gladis Gomez MD





Assistant: BERYL Cortez MD





Anesthesia: General





Findings: Right breast mass and clip present within radiograph specimen; x2 

SLNs; placement of 3x3 cm BioZorb marker





Complications: None





EBL: Less than 50 cc





Disposition: PACU in good condition





Indications for operative procedure: This is a 50 year old lady with newly 

diagnosed right breast cancer of the upper inner quadrant, IDCA grade 3, Stage 

II [id number] ER positive (1:00 position). Recommendations are to proceed with 

breast conservation.  She understands the role of adjuvant radiation therapy and

Oncotype DX will be obtained by medical oncology. She wished to proceed with the

above procedure.





Procedure in detail: Anesthesia placed right pectoral block. Patient was then 

taken to the operating room.  Gen. anesthesia was administered. The right nipple

was injected with radioisotope and 1 cc of methylene blue dye.  Right breast and

axilla were prepped and draped in the normal sterile operative fashion. Timeout 

was performed. Gamma probe was inserted into the axilla. The area of hot spot 

was identified. A right axillary incision was made with a 15 blade knife with 

dissection taken down to the subcutaneous tissues. The axillary fascia was 

opened with the Bovie cautery. 2 SLNs were identified and dissected free with 

blue dye present. All remaining counts were less than 10% of the highest count. 

Lymph node was sent to pathology for permanent processing. Hemostasis was 

obtained in the right axillary cavity. Axillary cavity was appropriately 

irrigated and suctioned. Hemostasis was noted. Axillary fascia was approximated 

and closed using interrupted 3-0 Vicryl and the skin brought together and closed

using a running 4-0 Monocryl followed by skin affix.





Attention was then taken towards the right breast. Ultrasound was used to santosh 

the area of incision; known breast malignancy at 1:00 position 4-6 cm FN of 3 

cm. A periarolear breast incision around 12-1:00 position was made with a 15 

blade knife and dissection taken down to subcutaneous tissues. First began 

raising of the superior flap with dissection taken superiorly past the area of 

known malignancy and then taken down to the pectoralis muscle, followed by 

raising of the inferior flap, medial flap and lateral flap with all flaps taken 

past the area of known malignancy and then posteriorly down to the pectoralis 

muscle. The breast area of concern was appropriately removed posteriorly from 

the pectoralis muscle with the aid of the Bovie cautery. Specimen was marked and

then sent to pathology and radiology; radiograph specimen with mass and clip 

present. Breast cavity was irrigated and hemostasis was obtained. 





Then proceeded with placement of BioZorb marker lot [id number].  The posterior 

deep breast tissues were then mobilized to attempt to approximate and cover the 

area of the defect of at least 7 cm; pectoralis muscle was partially 

approximated. The BioZorb of 3x3 centimeters was then sutured into place using 

interrupted 3-0 PDS, placing 4 sutures.  Anterior breast tissue were then 

approximated and closed using interrupted 3-0 Vicryl.  The BioZorb was not 

easily palpable.  The subcutaneous tissues were then approximated and closed 

using interrupted 3-0 Vicryl followed by closing of the skin with a running 4-0 

Monocryl and skin affix. The patient tolerated surgery very well and she was 

awaken from anesthesia without any complication and transported to PACU in good 

condition.

## 2020-12-23 NOTE — ANESTHESIA CONSULTATION
Anesthesia Consult and Med Hx


Date of service: 12/23/20





- Airway


Anesthetic Teeth Evaluation: Good


ROM Head & Neck: Adequate


Mental/Hyoid Distance: Adequate


Mallampati Class: Class III


Intubation Access Assessment: Possibly Difficult





- Pulmonary Exam


CTA: Yes





- Cardiac Exam


Cardiac Exam: RRR





- Pre-Operative Health Status


ASA Pre-Surgery Classification: ASA2


Proposed Anesthetic Plan: General


Nerve Block: PEC





- Pulmonary


Hx Smoking: No


Hx Respiratory Symptoms: No





- Cardiovascular System


Hx Hypertension: Yes (took HCTZ last night)


Hx Heart Attack/AMI: No





- Central Nervous System


CVA: No


Hx Psychiatric Problems: Yes (anxiety)





- Gastrointestinal


Hx Gastroesophageal Reflux Disease: No





- Endocrine


Hx Renal Disease: No


Hx Liver Disease: No


Hx Insulin Dependent Diabetes: No


Hx Non-Insulin Dependent Diabetes: No


Hx Thyroid Disease: No





- Other Systems


Hx Cancer: Yes (breast ca; no prior chemotherapy)


Hx Obesity: Yes (BMI 30)





- Additional Comments


Anesthesia Medical History Comments: No hx anesthetic complications.

## 2020-12-23 NOTE — PROGRESS NOTE
Regional Anesthesia Block





- Regional Anesthesia Block


Start Time: 07:38


Stop Time: 07:44


Performed By:: FER VILLALOBOS


Procedure: 


 [Right] Ultrasound Guided PEC Block  





Pt IDd, consent obtained, time out performed. Pt on monitor + O2 via NC, VS 

stable, sedation given per pre-op RN. Sterile prep. Landmarks identified with 

ultrasound. [2]cc skin wheel with 1% lidocaine. Needle advance in plane with 

ultrasound. [30]cc [0.5]% bupivacaine + [4]mg decadron injected incrementally 

with negative aspiration. Pt tolerated procedure well, no immediate 

complications noted.

## 2020-12-23 NOTE — MAMMOGRAPHY REPORT
Right breast tissue specimen radiograph



INDICATION: Excisional procedure today



Specimen contains the mass seen in the upper inner quadrant posteriorly on recent mammography with bi
opsy clip at the margin. 



IMPRESSION: Satisfactory specimen radiograph



Signer Name: Moo Benoit MD 

Signed: 12/23/2020 3:25 PM

Workstation Name: [id number]

## 2020-12-29 ENCOUNTER — HOSPITAL ENCOUNTER (OUTPATIENT)
Dept: HOSPITAL 5 - LABHHL | Age: 50
Discharge: HOME | End: 2020-12-29
Attending: SURGERY
Payer: COMMERCIAL

## 2020-12-29 DIAGNOSIS — C50.919: Primary | ICD-10-CM

## 2020-12-29 PROCEDURE — 88368 INSITU HYBRIDIZATION MANUAL: CPT

## 2021-03-25 ENCOUNTER — HOSPITAL ENCOUNTER (OUTPATIENT)
Dept: HOSPITAL 5 - PET | Age: 51
Discharge: HOME | End: 2021-03-25
Attending: INTERNAL MEDICINE
Payer: COMMERCIAL

## 2021-03-25 DIAGNOSIS — C50.111: Primary | ICD-10-CM

## 2021-03-25 PROCEDURE — 82962 GLUCOSE BLOOD TEST: CPT

## 2021-03-25 PROCEDURE — A9552 F18 FDG: HCPCS

## 2021-03-25 PROCEDURE — 78815 PET IMAGE W/CT SKULL-THIGH: CPT

## 2021-03-25 NOTE — PET REPORT
PET/CT



HISTORY: C50.111.  Staging of right breast cancer. Right breast surgery on 12/23/2020.



TECHNIQUE:  The patient's fasting blood glucose was 86.  The patient weighed 187 lbs.  The patient wa
s injected with 13.4 mCi of FDG in the right antecubital fossa at 0911 hours and imaging was started 
at 1010 hours.  The patient was imaged from the skull base to the thighs. All CT scans at this locati
on are performed using CT dose reduction for ALARA by means of automated exposure control. Images wer
e reviewed on a workstation.



COMPARISON:  No relevant comparison at this facility



FINDINGS:



IMAGED BRAIN: Physiologic FDG uptake.

NECK: Physiologic FDG uptake.

CHEST WALL:  Surgical changes/scarring are identified in the central and superior right breast. There
 is mild diffuse uptake in this area with max SUV measuring up to 4.1. This is presumably postsurgica
l in nature. No discrete recurrent breast mass is appreciated..

MEDIASTINUM:  Physiologic FDG uptake.

LUNGS:  Physiologic FDG uptake. No suspicious pulmonary nodule.

HEPATOBILIARY:  Physiologic FDG uptake. There is a 1.4 cm hypodense lesion in the posterior right hep
atic lobe which is hypometabolic with max SUV measuring 1.9. This probably represents a small caverno
us hemangioma. If further evaluation is needed consider 4 phase liver CT with contrast or MRI with co
ntrast. Background liver uptake in the right hepatic lobe measures a max SUV of 3.9.

PANCREAS:  Physiologic FDG uptake.

SPLEEN:  Physiologic FDG uptake.

KIDNEYS/BLADDER:  Physiologic FDG uptake.

ADRENAL GLANDS:  Physiologic FDG uptake.

GI/MESENTERY:  Physiologic FDG uptake.

PELVIC VISCERA:  Physiologic FDG uptake. Hysterectomy changes are noted.

LYMPH NODES:  There are 2 lymph nodes in the right axilla which are normal size measuring 0.9 cm in s
hort axis but demonstrate mild hypermetabolic activity with max SUV measuring 3.2 and 5.9.

OSSEOUS STRUCTURES:  Physiologic FDG uptake. No suspicious bony lesions are detected.



ADDITIONAL FINDINGS:  None.





IMPRESSION:

Surgical changes in the right breast are identified. There is mild uptake in the surgical site with m
ax SUV measuring 4.1 which is assumed to be postsurgical in nature. No discrete recurrent breast mass
 is appreciated on the CT images.

There are 2 normal size but mildly hypermetabolic right axillary lymph nodes concerning for metastasi
s.

No evidence for pulmonary, hepatic or osseous metastasis.

Incompletely characterized 1.4 cm right hepatic lobe hypodensity which probably represents a cavernou
s hemangioma. See above.



Signer Name: Lakhwinder Sparrow Jr, MD 

Signed: 3/25/2021 11:31 AM

Workstation Name: [id number]

## 2021-03-30 ENCOUNTER — HOSPITAL ENCOUNTER (OUTPATIENT)
Dept: HOSPITAL 5 - OR | Age: 51
Discharge: HOME | End: 2021-03-30
Attending: SURGERY
Payer: COMMERCIAL

## 2021-03-30 VITALS — SYSTOLIC BLOOD PRESSURE: 132 MMHG | DIASTOLIC BLOOD PRESSURE: 96 MMHG

## 2021-03-30 DIAGNOSIS — Z72.89: ICD-10-CM

## 2021-03-30 DIAGNOSIS — Z79.899: ICD-10-CM

## 2021-03-30 DIAGNOSIS — Z98.891: ICD-10-CM

## 2021-03-30 DIAGNOSIS — Z20.822: ICD-10-CM

## 2021-03-30 DIAGNOSIS — C50.919: Primary | ICD-10-CM

## 2021-03-30 DIAGNOSIS — Z98.51: ICD-10-CM

## 2021-03-30 DIAGNOSIS — Z98.890: ICD-10-CM

## 2021-03-30 DIAGNOSIS — Z88.5: ICD-10-CM

## 2021-03-30 DIAGNOSIS — K21.9: ICD-10-CM

## 2021-03-30 DIAGNOSIS — F41.9: ICD-10-CM

## 2021-03-30 DIAGNOSIS — Z88.0: ICD-10-CM

## 2021-03-30 DIAGNOSIS — Z90.710: ICD-10-CM

## 2021-03-30 DIAGNOSIS — D64.9: ICD-10-CM

## 2021-03-30 DIAGNOSIS — E78.00: ICD-10-CM

## 2021-03-30 PROCEDURE — 77001 FLUOROGUIDE FOR VEIN DEVICE: CPT

## 2021-03-30 PROCEDURE — 36561 INSERT TUNNELED CV CATH: CPT

## 2021-03-30 PROCEDURE — U0003 INFECTIOUS AGENT DETECTION BY NUCLEIC ACID (DNA OR RNA); SEVERE ACUTE RESPIRATORY SYNDROME CORONAVIRUS 2 (SARS-COV-2) (CORONAVIRUS DISEASE [COVID-19]), AMPLIFIED PROBE TECHNIQUE, MAKING USE OF HIGH THROUGHPUT TECHNOLOGIES AS DESCRIBED BY CMS-2020-01-R: HCPCS

## 2021-03-30 PROCEDURE — C1769 GUIDE WIRE: HCPCS

## 2021-03-30 PROCEDURE — C1788 PORT, INDWELLING, IMP: HCPCS

## 2021-03-30 NOTE — FLUOROSCOPY REPORT
CHEST 1 VIEW 



INDICATION:  PORT A CATH PLACEMENT.



COMPARISON:  3/30/2021



FINDINGS:

Support devices: Left Uyhnmn-a-Bnpr remains in the same position terminating in the superior right at
rium. 



Heart: Within normal limits. 

Lungs/Pleura: No acute air space or interstitial disease. No pneumothorax.



Additional findings: 1 minute and 23 seconds of fluoroscopy time was provided by radiology. 2 images 
are presented.



IMPRESSION:

 No acute findings.



Signer Name: Lakhwinder Sparrow Jr, MD 

Signed: 3/30/2021 2:14 PM

Workstation Name: [id number]

## 2021-03-30 NOTE — SHORT STAY SUMMARY
Short Stay Documentation


Date of service: 03/30/21





- History


Principal diagnosis: breast cancer


H&P: obtained from office





- Allergies and Medications


Current Medications: 


                                    Allergies





codeine Allergy (Verified 03/26/21 14:12)


   nausea and vomiting


Penicillins Allergy (Verified 03/26/21 14:12)


   rash, swelling, itching





                                Home Medications











 Medication  Instructions  Recorded  Confirmed  Last Taken  Type


 


Triamter/Hctz 37.5-25 mg 1 tab PO DAILY 02/18/19 03/30/21 03/30/21 07:00 History








Active Medications





Hydromorphone HCl (Hydromorphone 1 Mg/1 Ml Inj)  0.25 mg IV Q10MIN PRN


   PRN Reason: Pain, Moderate (4-6)


   Stop: 03/30/21 23:00


Hydromorphone HCl (Hydromorphone 1 Mg/1 Ml Inj)  0.5 mg IV Q10MIN PRN


   PRN Reason: Pain , Severe (7-10)


   Stop: 03/30/21 23:00


Vancomycin HCl 1,250 mg/ (Sodium Chloride)  275 mls @ 166.667 mls/hr IV PREOP NR


   Stop: 03/30/21 20:00


   Last Admin: 03/30/21 12:03 Dose:  166.667 mls/hr


   Documented by: 


Lactated Ringer's (Lactated Ringers)  1,000 mls @ 100 mls/hr IV AS DIRECT LUCY


   Last Admin: 03/30/21 10:30 Dose:  100 mls/hr


   Documented by: 


Ondansetron HCl (Ondansetron 4 Mg/2 Ml Inj)  4 mg IV ONCE PRN


   PRN Reason: Nausea And Vomiting


   Stop: 03/30/21 16:00


Triamcinolone Acetonide (Triamcinolone 40 Mg/1 Ml Inj)  40 mg IM ONCE NR


   Stop: 03/30/21 20:00











- Brief post op/procedure progress note


Date of procedure: 03/30/21


Pre-op diagnosis: right breast cancer


Post-op diagnosis: same


Procedure: 





placement of left subclavian vein port a cath with bedside ultrasound guidance


Anesthesia: MAC, local


Findings: 





Good placement of port without PTX on intra op CXR


Surgeon: TEO VALADEZ


Estimated blood loss: minimal


Pathology: none


Condition: stable





- Hospital course


Hospital course: 





Pt observed in PACU and discharged to home in stable condition when criteria met





- Disposition


Condition at discharge: Good


Disposition: DC-01 TO HOME OR SELFCARE





Short Stay Discharge Plan


Activity: other (avoid heavy lifting with left arm for next 7 days)


Diet: regular


Wound: open to air, other (may shower tomorrow, pat incisions dry and do not 

scrub. Do not submerge incision in hottub, bath, pool for 2 weeks)


Follow up with: 


SOY AGUSTIN JR, MD [Primary Care Provider] - 7 Days


TEO VALADEZ DO [Staff Physician] - 14 Days


Prescriptions: 


HYDROcodone/APAP 5-325 [Grady 5/325] 1 each PO Q6HR PRN #10 tablet


 PRN Reason: Pain

## 2021-03-30 NOTE — ANESTHESIA CONSULTATION
Anesthesia Consult and Med Hx


Date of service: 03/30/21





- Airway


Anesthetic Teeth Evaluation: Good, Crowns (Temp)


ROM Head & Neck: Adequate


Mental/Hyoid Distance: Adequate


Mallampati Class: Class II


Intubation Access Assessment: Good





- Pre-Operative Health Status


ASA Pre-Surgery Classification: ASA2


Proposed Anesthetic Plan: MAC (GA if needed)





- Pulmonary


Hx Smoking: No


Hx Respiratory Symptoms: No





- Cardiovascular System


Hx Hypertension: Yes


Hx Percutaneous Transluminal Coronary Angioplasty (PTCA): No


Hx Cardia Arrhythmia: No


Hx Pacemaker: No


Hx Valvular Heart Disease: No


Hx Peripheral Vascular Disease: No





- Central Nervous System


Hx Neuromuscular Disorder: No


Hx Back Pain: No


Hx Psychiatric Problems: Yes





- Gastrointestinal


Hx Gastroesophageal Reflux Disease: No





- Endocrine


Hx Insulin Dependent Diabetes: No


Hx Non-Insulin Dependent Diabetes: No


Hx Thyroid Disease: No





- Hematic


Hx Anemia: Yes (Resolved)


Hx Sickle Cell Disease: No





- Other Systems


Hx Alcohol Use: Yes (Occas)


Hx Cancer: Yes





- Additional Comments


Anesthesia Medical History Comments: NO BLOOD.  Here [id number]

## 2021-04-01 NOTE — OPERATIVE REPORT
Operative Report


Operative Report: 


Date of procedure: 03/30/21


Pre-op diagnosis: right breast cancer


Post-op diagnosis: same


Procedure: 





placement of left subclavian vein port a cath with bedside ultrasound guidance


Anesthesia: MAC, local


Findings: 





Good placement of port without PTX on intra op CXR


Surgeon: TEO VALADEZ


Estimated blood loss: minimal


Pathology: none


Condition: stable


Hospital course: 


Pt observed in PACU and discharged to home in stable condition when criteria met








HPI and indication: Patient is a 51-year-old female with a recent diagnosis of 

right-sided breast cancer.  The patient is seen by Dr. Rodriguez and deemed a 

candidate for chemotherapy. All of the risks associated with the procedure were 

discussed with the patient including but not limited to pneumothorax, infection,

bleeding, malpositioned port, injury to other structures.  The patient 

understands and all questions were answered.  Consent was signed and placed on 

chart.





Procedure in detail: The patient was identified in the preoperative area, taken 

back to operating room, placed on operating table in supine position.  After 

anesthesia was induced both arms were tucked and upper chest and neck were 

prepped and draped in usual sterile fashion.  A timeout was performed.  The was 

placed in Trendelenburg position.  Local anesthetic was infiltrated into the 

skin at the intended puncture site.  The left subclavian vein and left internal 

internal jugular vein were identified with bedside ultrasound. The left IJ vein 

was accessed on the first stick with return of dark red, nonpulsatile blood.  

The wire was threaded without resistance but was crossing to the right side into

the IJ and could not be advanced into the SVC. The L IJ vein was once again 

accessed using ultrasound guidance and a glidewire was threaded through the 

needed. However there was resistance and the wire and needle was removed. 

Pressure was held at the puncture site and hemostasis ensured. No hematoma.





The left subclavian vein was identified using ultrasound.  This was accessed on 

the first stick and there was return of nonpulsatile blood.  A Glidewire was 

threaded without resistance and the positioning was confirmed in the right 

atrium using fluoroscopy.  The wire was also visualized in the left subclavian 

vein on ultrasound. The needle was removed. Using a 15 blade, an incision was 

made in the left upper chest and dissection carried down through the skin and 

subcutaneous tissue using Bovie electrocautery.  Hemostasis was achieved along 

the way.  A pocket for the port was then created bluntly and with 

electrocautery.  The catheter was flushed and tunneled from the pocket to the 

wire.  A breakaway catheter/dilator sheath then inserted over the wire under 

fluoroscopy, and the wire and dilator removed.  The catheter was then inserted 

through the breakaway catheter which was then removed.  The catheter sat flush 

under the skin.  Using continuous fluoroscopy, the catheter was pulled back 

until the tip was visualized at the cavoatrial junction.  The catheter was then 

cut to size and the port attached in the usual fashion.  The port was then 

sutured into place to the pre-pectoral fascia using 2-0 Vicryl interrupted 

sutures.  The wound was irrigated and hemostasis ensured.  The port was tested 

with heparinized saline and there was return of blood and it flushed easily.  

The port was then instilled with 3000 units of undiluted heparin.  The deep 

dermal layer was then closed with interrupted 3-0 Vicryl stitches.  The skin 

incisions were closed with 4-0 Monocryl subcuticular stitches and skin glue.





Intraoperative chest x-ray did show good positioning of the port, without 

evidence of pneumothorax





At the end of the case, all sponge, instrument, sharp counts were correct 2.  

The patient was awoken from anesthesia and taken to PACU in stable condition.

## 2021-04-15 ENCOUNTER — HOSPITAL ENCOUNTER (OUTPATIENT)
Dept: HOSPITAL 5 - LAB | Age: 51
Discharge: HOME | End: 2021-04-15
Attending: SURGERY
Payer: COMMERCIAL

## 2021-04-15 ENCOUNTER — HOSPITAL ENCOUNTER (INPATIENT)
Dept: HOSPITAL 5 - 3A | Age: 51
LOS: 5 days | Discharge: HOME HEALTH | DRG: 314 | End: 2021-04-20
Attending: INTERNAL MEDICINE | Admitting: INTERNAL MEDICINE
Payer: COMMERCIAL

## 2021-04-15 DIAGNOSIS — Z82.49: ICD-10-CM

## 2021-04-15 DIAGNOSIS — Z88.0: ICD-10-CM

## 2021-04-15 DIAGNOSIS — Z88.5: ICD-10-CM

## 2021-04-15 DIAGNOSIS — T80.211A: Primary | ICD-10-CM

## 2021-04-15 DIAGNOSIS — C50.211: Primary | ICD-10-CM

## 2021-04-15 DIAGNOSIS — C50.919: ICD-10-CM

## 2021-04-15 DIAGNOSIS — Z92.21: ICD-10-CM

## 2021-04-15 DIAGNOSIS — A41.01: ICD-10-CM

## 2021-04-15 DIAGNOSIS — D69.6: ICD-10-CM
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
## 2021-04-15 LAB
BAND NEUTROPHILS # (MANUAL): 2.2 K/MM3
BILIRUB UR QL STRIP: (no result)
BLOOD UR QL VISUAL: (no result)
BUN SERPL-MCNC: 12 MG/DL (ref 7–17)
BUN/CREAT SERPL: 8 %
CALCIUM SERPL-MCNC: 9.1 MG/DL (ref 8.4–10.2)
HCT VFR BLD CALC: 36.6 % (ref 30.3–42.9)
HEMOLYSIS INDEX: 6
HGB BLD-MCNC: 12.4 GM/DL (ref 10.1–14.3)
MCHC RBC AUTO-ENTMCNC: 34 % (ref 30–34)
MCV RBC AUTO: 86 FL (ref 79–97)
MUCOUS THREADS #/AREA URNS HPF: (no result) /HPF
MYELOCYTES # (MANUAL): 0 K/MM3
PH UR STRIP: 6 [PH] (ref 5–7)
PLATELET # BLD: 139 K/MM3 (ref 140–440)
PROMYELOCYTES # (MANUAL): 0 K/MM3
RBC # BLD AUTO: 4.24 M/MM3 (ref 3.65–5.03)
RBC #/AREA URNS HPF: 20 /HPF (ref 0–6)
TOTAL CELLS COUNTED BLD: 100
UROBILINOGEN UR-MCNC: 2 MG/DL (ref ?–2)
WBC #/AREA URNS HPF: 8 /HPF (ref 0–6)

## 2021-04-15 PROCEDURE — 85007 BL SMEAR W/DIFF WBC COUNT: CPT

## 2021-04-15 PROCEDURE — 87186 SC STD MICRODIL/AGAR DIL: CPT

## 2021-04-15 PROCEDURE — 85025 COMPLETE CBC W/AUTO DIFF WBC: CPT

## 2021-04-15 PROCEDURE — 36415 COLL VENOUS BLD VENIPUNCTURE: CPT

## 2021-04-15 PROCEDURE — 80048 BASIC METABOLIC PNL TOTAL CA: CPT

## 2021-04-15 PROCEDURE — 82140 ASSAY OF AMMONIA: CPT

## 2021-04-15 PROCEDURE — 93306 TTE W/DOPPLER COMPLETE: CPT

## 2021-04-15 PROCEDURE — 87040 BLOOD CULTURE FOR BACTERIA: CPT

## 2021-04-15 PROCEDURE — 87116 MYCOBACTERIA CULTURE: CPT

## 2021-04-15 PROCEDURE — 87076 CULTURE ANAEROBE IDENT EACH: CPT

## 2021-04-15 PROCEDURE — 80202 ASSAY OF VANCOMYCIN: CPT

## 2021-04-15 PROCEDURE — 80053 COMPREHEN METABOLIC PANEL: CPT

## 2021-04-15 PROCEDURE — 81001 URINALYSIS AUTO W/SCOPE: CPT

## 2021-04-15 PROCEDURE — 0JPT0WZ REMOVAL OF TOTALLY IMPLANTABLE VASCULAR ACCESS DEVICE FROM TRUNK SUBCUTANEOUS TISSUE AND FASCIA, OPEN APPROACH: ICD-10-PCS | Performed by: SURGERY

## 2021-04-15 PROCEDURE — 93005 ELECTROCARDIOGRAM TRACING: CPT

## 2021-04-15 RX ADMIN — ACETAMINOPHEN PRN MG: 325 TABLET ORAL at 22:47

## 2021-04-15 RX ADMIN — Medication SCH ML: at 17:58

## 2021-04-15 NOTE — PROCEDURE NOTE
Date of procedure: 04/15/21


Pre-op diagnosis: sepsis, infected port


Post-op diagnosis: same


Procedure: 





removal of infusaport


Findings: 


Findings: Purulent exudate in pocket and around port and visible portion of 

catheter





HPI and indication: 52 yo F with breast cancer who underwent L sided port 

placement on 3/30/21. Patient had one chemotherapy treatment after initial 

placement. She was sent to surgery clinic today from oncology center for 

possible port infection. Patient was sent for labs which showed WBC of 18, Crt 

1.6 from 0.9 baseline, and hyponatremia. Patient was directed admitted to 

hospital for sepsis secondary to port infection. It was recommended that port be

removed for source control. Pt agreeable and consent obtained.





Procedure in detail: Pt was identified and brought to the minor procedure room 

on a stretcher. She was placed in slight trendelenberg. The left upper chest was

prepped and draped in sterile fashion and time out performed. The skin and 

subcutaneous tissue was anesthetized with local anesthetic. The skin was already

 and the deep dermal stitches were carefully cut with 15 blade. The 

subcutaneous tissue was then bluntly dissected with hemostat and port and 

catheter easily identified. There was purulent exudate surrounding the port and 

visible catheter. The catheter was grasped between two hemostats and cut. The 

sutures of the subcutaneous port were cut with suture scissors and the port 

removed. The catheter was then removed and pressure held for 5 minutes. There 

was no bleeding. The port and catheter were sent to the lab for cultures. The 

wound was irrigated with warm saline. Hemostasis was ensured. The wound was 

packed with one piece of saline moistened gauze. This was covered with dry 4x4 

gauze and secured with tegaderm. 





The patient tolerated the procedure well and all sharps, instruments, and 

sponges were accounted for. 





The patient was returned to the hospital room in stable condition.


Anesthesia: local


Surgeon: TEO VALADEZ


Estimated blood loss: minimal


Pathology: list (port and catheter sent for culture)


Specimen disposition: to lab


Condition: stable


Disposition: PACU

## 2021-04-15 NOTE — EVENT NOTE
Date: 04/15/21





Pt seen in office today. Fever of 103 and partial separation of skin of port 

incision with slough/fibrinous exudate. Patient sent for blood work. WBC 18 and 

BMP shows signs of volume depletion, Crt 1.6 from baseline 0.9. Patient direct 

admitted to Dr. Celeste for further w/u and treatment of sepsis. Port is presumed 

source. Discussed with oncologist and decision made to remove port and send tip 

for cx. Will perform procedure today. Patient is agreeable.





Full consult from office is on chart.

## 2021-04-15 NOTE — HISTORY AND PHYSICAL REPORT
History of Present Illness


Chief complaint: 


I am having fever





History of present illness: 


50 YO Female with BrCa admitted to directly to [facility] at the request of . 

Patient was seen and evaluated in the office today and was found to have fever 

to 102 F as well as tenderness around her port site.  Patient underwent routine

lab work and was found to have leukocytosis.  The combination of the 

aforementioned symptoms are consistent with sepsis.  The patient was admitted 

directly to the medical floor and initiated on sepsis protocol admission.  

Patient seen and evaluated in her room upon arrival.  Patient knowledges fever, 

fatigue  Patient denies chills, chest pain, palpitations, productive cough, skin

rash, recent ill contact, or known exposure to COVID-19.  No prior admission for

review.  All medication listed at time of admission has been reconciled.


Surgery team consulted.  Patient taken to the operating room and underwent 

discontinuation of port.





Past History


Past Medical History: cancer


Past Surgical History: Other (Port placement and removal)


Social history: , lives with family.  denies: smoking, alcohol abuse, 

prescription drug abuse


Family history: hypertension





Medications and Allergies


                                    Allergies











Allergy/AdvReac Type Severity Reaction Status Date / Time


 


codeine Allergy  nausea and Verified 03/26/21 14:12





   vomiting  


 


Penicillins Allergy  rash, Verified 03/26/21 14:12





   swelling,  





   itching  











                                Home Medications











 Medication  Instructions  Recorded  Confirmed  Last Taken  Type


 


Triamter/Hctz 37.5-25 mg 1 tab PO DAILY 02/18/19 03/30/21 03/30/21 07:00 History


 


HYDROcodone/APAP 5-325 [[place] 1 each PO Q6HR PRN #10 tablet 03/30/21  Unknown Rx





5/325]     














Review of Systems


Constitutional: fever, fatigue, no chills, no sweats


Ears, nose, mouth and throat: no ear pain, no ear discharge, no decreased 

hearing, no nose pain, no nasal congestion, no nasal discharge


Breasts: no change in shape, no swelling, no mass


Cardiovascular: no chest pain, no orthopnea, no edema


Respiratory: no cough, no cough with sputum, no excessive sputum, no shortness 

of breath, no dyspnea on exertion


Gastrointestinal: no abdominal pain, no nausea, no vomiting, no diarrhea, no 

constipation, no change in bowel habits


Genitourinary Female: no pelvic pain, no flank pain, no dysuria, no urinary 

frequency, no urgency


Rectal: no pain, no incontinence, no bleeding


Musculoskeletal: no neck stiffness, no neck pain, no shooting arm pain, no arm 

numbness/tingling, no leg numbness/tingling


Integumentary: no rash, no pruritis, no redness, no sores, no wounds, no 

jaundice


Neurological: no head injury, no paralysis, no weakness, no numbness, no 

tingling, no seizures, no syncope


Psychiatric: no anxiety, no memory loss, no insomnia, no hypersomnia, no change 

in appetite


Endocrine: no cold intolerance, no polyphagia, no polydipsia, no polyuria


Hematologic/Lymphatic: no easy bruising


Allergic/Immunologic: no urticaria, no allergic rhinitis, no wheezing





Exam





- Constitutional


General appearance: Present: mild distress





- EENT


Eyes: Present: PERRL


ENT: hearing intact, clear oral mucosa





- Neck


Neck: Present: supple, normal ROM





- Respiratory


Respiratory effort: normal


Respiratory: bilateral: CTA





- Cardiovascular


Heart Sounds: Present: S1 & S2.  Absent: rub, click





- Extremities


Extremities: pulses symmetrical, No edema


Peripheral Pulses: abnormal (Capillary refill greater than 3.5 seconds)





- Abdominal


General gastrointestinal: Present: soft, non-tender, non-distended, normal bowel

sounds


Female genitourinary: Present: normal





- Integumentary


Integumentary: Present: clear, warm, dry





- Musculoskeletal


Musculoskeletal: gait normal, strength equal bilaterally





- Psychiatric


Psychiatric: appropriate mood/affect, intact judgment & insight





- Neurologic


Neurologic: CNII-XII intact, moves all extremities





Assessment and Plan





- Patient Problems


(1) Sepsis


Current Visit: Yes   Status: Acute   


Plan to address problem: 


Sepsis protocol: CBC, CMP, blood culture, catheter tip culture, discontinue 

Port-A-Cath, IV antibiotic therapy, monitor fluid balance, maintain mean 

arterial pressure greater than or equal to 65








(2) Breast cancer


Current Visit: Yes   Status: Acute   


Plan to address problem: 


Outpatient oncology follow-up, continue supportive care.








(3) Infection due to Port-A-Cath


Current Visit: Yes   Status: Acute   


Qualifiers: 


   Encounter type: initial encounter   Qualified Code(s): T80.219A - Unspecified

infection due to central venous catheter, initial encounter   


Plan to address problem: 


Surgery team consulted, patient taken operating room for discontinuation of 

Port-A-Cath.  Catheter tip culture ordered and is pending at time of admission








(4) DVT prophylaxis


Current Visit: Yes   Status: Acute   


Plan to address problem: 


SCD to bilateral lower extremities while in bed, patient is ambulatory.

## 2021-04-16 LAB
ALBUMIN SERPL-MCNC: 3.5 G/DL (ref 3.9–5)
ALT SERPL-CCNC: 342 UNITS/L (ref 7–56)
BAND NEUTROPHILS # (MANUAL): 0.3 K/MM3
BUN SERPL-MCNC: 11 MG/DL (ref 7–17)
BUN SERPL-MCNC: 9 MG/DL (ref 7–17)
BUN/CREAT SERPL: 9 %
BUN/CREAT SERPL: 9 %
CALCIUM SERPL-MCNC: 8.2 MG/DL (ref 8.4–10.2)
CALCIUM SERPL-MCNC: 8.6 MG/DL (ref 8.4–10.2)
HCT VFR BLD CALC: 34.6 % (ref 30.3–42.9)
HEMOLYSIS INDEX: 1
HEMOLYSIS INDEX: 15
HGB BLD-MCNC: 11.4 GM/DL (ref 10.1–14.3)
MCHC RBC AUTO-ENTMCNC: 33 % (ref 30–34)
MCV RBC AUTO: 87 FL (ref 79–97)
MYELOCYTES # (MANUAL): 0 K/MM3
PLATELET # BLD: 128 K/MM3 (ref 140–440)
PROMYELOCYTES # (MANUAL): 0 K/MM3
RBC # BLD AUTO: 3.99 M/MM3 (ref 3.65–5.03)
TOTAL CELLS COUNTED BLD: 100

## 2021-04-16 RX ADMIN — ACETAMINOPHEN PRN MG: 325 TABLET ORAL at 21:33

## 2021-04-16 RX ADMIN — SODIUM CHLORIDE SCH MLS/HR: 0.9 INJECTION, SOLUTION INTRAVENOUS at 03:57

## 2021-04-16 RX ADMIN — Medication SCH ML: at 02:59

## 2021-04-16 RX ADMIN — Medication SCH ML: at 23:50

## 2021-04-16 RX ADMIN — MORPHINE SULFATE PRN MG: 2 INJECTION, SOLUTION INTRAMUSCULAR; INTRAVENOUS at 14:32

## 2021-04-16 RX ADMIN — Medication SCH ML: at 12:12

## 2021-04-16 RX ADMIN — TRIAMTERENE AND HYDROCHLOROTHIAZIDE SCH EACH: 37.5; 25 TABLET ORAL at 09:49

## 2021-04-16 RX ADMIN — VANCOMYCIN HYDROCHLORIDE SCH MLS/HR: 5 INJECTION, POWDER, LYOPHILIZED, FOR SOLUTION INTRAVENOUS at 20:23

## 2021-04-16 RX ADMIN — SODIUM CHLORIDE SCH MLS/HR: 0.9 INJECTION, SOLUTION INTRAVENOUS at 13:14

## 2021-04-16 RX ADMIN — ACETAMINOPHEN PRN MG: 325 TABLET ORAL at 12:28

## 2021-04-16 RX ADMIN — MORPHINE SULFATE PRN MG: 2 INJECTION, SOLUTION INTRAMUSCULAR; INTRAVENOUS at 20:36

## 2021-04-16 RX ADMIN — ACETAMINOPHEN PRN MG: 325 TABLET ORAL at 05:34

## 2021-04-16 RX ADMIN — VANCOMYCIN HYDROCHLORIDE SCH MLS/HR: 5 INJECTION, POWDER, LYOPHILIZED, FOR SOLUTION INTRAVENOUS at 08:15

## 2021-04-16 RX ADMIN — MORPHINE SULFATE PRN MG: 2 INJECTION, SOLUTION INTRAMUSCULAR; INTRAVENOUS at 09:48

## 2021-04-16 RX ADMIN — BUTALBITAL, ACETAMINOPHEN, AND CAFFEINE PRN TAB: 50; 325; 40 TABLET ORAL at 23:47

## 2021-04-16 RX ADMIN — ONDANSETRON PRN MG: 2 INJECTION INTRAMUSCULAR; INTRAVENOUS at 20:37

## 2021-04-16 NOTE — PROGRESS NOTE
Assessment and Plan





51 year old female POd#1 s/p removal of infected chemo-port. Stable, with 

improvement in fever curve and decreased leukocytosis. 


Waiting for results from blood culture and tip of catheter. 


Continue abx and daily wound packing dressings. 





Subjective


Date of service: 04/16/21


Patient Reports: Positive: feels better (No acute events overnight. Pt says she 

has some pain at the port side.), fever


Narrative: 





No acute events overnight. Pt says she still has some discomfort where her port 

was. 





Objective


                               Vital Signs - 12hr











  04/16/21 04/16/21





  04:53 08:23


 


Temperature 100.1 F H 


 


Pulse Rate 106 H 


 


Respiratory 16 





Rate  


 


Blood Pressure 114/72 


 


O2 Sat by Pulse 96 100





Oximetry  














- General physical appearance


well developed, well nourished, no distress, no pain





- Respiratory


normal expansion, normal respiratory effort





- Additional Exam





The packing was removed from the port site. There was no erythema, purulent 

drainage, or odor. appropriately tender. Wound was repacked.





- Labs





                                 04/16/21 09:10





                                 04/16/21 09:10


                                 Diabetes panel











  04/16/21 Range/Units





  09:10 


 


Sodium  135 L  (137-145)  mmol/L


 


Potassium  3.0 L  (3.6-5.0)  mmol/L


 


Chloride  97.4 L  ()  mmol/L


 


Carbon Dioxide  25  (22-30)  mmol/L


 


BUN  11  (7-17)  mg/dL


 


Creatinine  1.2  (0.6-1.2)  mg/dL


 


Glucose  92  ()  mg/dL


 


Calcium  8.6  (8.4-10.2)  mg/dL


 


AST  115 H  (5-40)  units/L


 


ALT  342 H  (7-56)  units/L


 


Alkaline Phosphatase  335 H  ()  units/L


 


Total Protein  6.7  (6.3-8.2)  g/dL


 


Albumin  3.5 L  (3.9-5)  g/dL








                                  Calcium panel











  04/16/21 Range/Units





  09:10 


 


Calcium  8.6  (8.4-10.2)  mg/dL


 


Albumin  3.5 L  (3.9-5)  g/dL








                                 Pituitary panel











  04/16/21 Range/Units





  09:10 


 


Sodium  135 L  (137-145)  mmol/L


 


Potassium  3.0 L  (3.6-5.0)  mmol/L


 


Chloride  97.4 L  ()  mmol/L


 


Carbon Dioxide  25  (22-30)  mmol/L


 


BUN  11  (7-17)  mg/dL


 


Creatinine  1.2  (0.6-1.2)  mg/dL


 


Glucose  92  ()  mg/dL


 


Calcium  8.6  (8.4-10.2)  mg/dL








                                  Adrenal panel











  04/16/21 Range/Units





  09:10 


 


Sodium  135 L  (137-145)  mmol/L


 


Potassium  3.0 L  (3.6-5.0)  mmol/L


 


Chloride  97.4 L  ()  mmol/L


 


Carbon Dioxide  25  (22-30)  mmol/L


 


BUN  11  (7-17)  mg/dL


 


Creatinine  1.2  (0.6-1.2)  mg/dL


 


Glucose  92  ()  mg/dL


 


Calcium  8.6  (8.4-10.2)  mg/dL


 


Total Bilirubin  0.50  (0.1-1.2)  mg/dL


 


AST  115 H  (5-40)  units/L


 


ALT  342 H  (7-56)  units/L


 


Alkaline Phosphatase  335 H  ()  units/L


 


Total Protein  6.7  (6.3-8.2)  g/dL


 


Albumin  3.5 L  (3.9-5)  g/dL

## 2021-04-16 NOTE — PROGRESS NOTE
Assessment and Plan





- Patient Problems


(1) Staphylococcus aureus bacteremia with sepsis


Current Visit: Yes   Status: Acute   


Plan to address problem: 


Cocci in clusters


On IV Vanco and Levaquin








(2) Breast cancer


Current Visit: Yes   Status: Acute   


Plan to address problem: 


Follow up with oncology for chemo








(3) Infection due to Port-A-Cath


Current Visit: Yes   Status: Acute   


Qualifiers: 


   Encounter type: initial encounter   Qualified Code(s): T80.219A - Unspecified

infection due to central venous catheter, initial encounter   


Plan to address problem: 


Port removed


Port infective


Cath tip positive 








(4) DVT prophylaxis


Current Visit: Yes   Status: Acute   


Plan to address problem: 


o Heparin and GI prophylaxis








Subjective


Date of service: 04/16/21


Principal diagnosis: Staph bacteremia


Interval history: 





52 YO Female with BrCa admitted to directly to [facility] at the request of . 

Patient was seen and evaluated in the office today and was found to have fever 

to 102 F as well as tenderness around her port site.  Patient underwent routine

lab work and was found to have leukocytosis.  The combination of the 

aforementioned symptoms are consistent with sepsis.  The patient was admitted 

directly to the medical floor and initiated on sepsis protocol admission.  

Patient seen and evaluated in her room upon arrival.  Patient knowledges fever, 

fatigue  Patient denies chills, chest pain, palpitations, productive cough, skin

rash, recent ill contact, or known exposure to COVID-19.  No prior admission for

review.  All medication listed at time of admission has been reconciled.


Surgery team consulted.  Patient taken to the operating room and underwent 

discontinuation of port.





4/16


Port removed 


Cultures pending


Started on IV vancomycin ang IV levaquin


ID consult requested











Objective





- Constitutional


Vitals: 


                               Vital Signs - 12hr











  04/16/21 04/16/21 04/16/21





  08:23 14:31 14:50


 


Temperature  100.1 F H 


 


O2 Sat by Pulse 100  96





Oximetry   











General appearance: Present: no acute distress, well-nourished





- EENT


Eyes: PERRL, EOM intact


ENT: hearing intact, clear oral mucosa


Ears: bilateral: normal





- Neck


Neck: supple, normal ROM





- Respiratory


Respiratory effort: normal


Respiratory: bilateral: CTA





- Breasts


Breasts: normal





- Cardiovascular


Heart rate: 78


Rhythm: regular


Heart Sounds: Present: S1 & S2.  Absent: gallop, rub


Extremities: pulses intact, No edema, normal color, Full ROM





- Gastrointestinal


General gastrointestinal: Present: soft, non-tender, non-distended, normal bowel

sounds





- Genitourinary


Female genitourinary: normal





- Integumentary


Integumentary: clear, warm, dry





- Musculoskeletal


Musculoskeletal: 1, strength equal bilaterally





- Neurologic


Neurologic: moves all extremities





- Psychiatric


Psychiatric: memory intact, appropriate mood/affect, intact judgment & insight





- Labs


CBC & Chem 7: 


                                 04/19/21 08:23





                                 04/19/21 08:23


Labs: 


                              Abnormal lab results











  04/15/21 04/15/21 04/16/21 Range/Units





  17:45 18:54 09:10 


 


WBC    16.3 H  (4.5-11.0)  K/mm3


 


Plt Count    128 L  (140-440)  K/mm3


 


Seg Neuts % (Manual)    83.0 H  (40.0-70.0)  %


 


Lymphocytes % (Manual)    9.0 L  (13.4-35.0)  %


 


Seg Neutrophils # Man    13.5 H  (1.8-7.7)  K/mm3


 


Sodium     (137-145)  mmol/L


 


Potassium     (3.6-5.0)  mmol/L


 


Chloride     ()  mmol/L


 


Lactic Acid   4.10 H*   (0.7-2.0)  mmol/L


 


AST     (5-40)  units/L


 


ALT     (7-56)  units/L


 


Alkaline Phosphatase     ()  units/L


 


Albumin     (3.9-5)  g/dL


 


Urine WBC (Auto)  8.0 H    (0.0-6.0)  /HPF














  04/16/21 Range/Units





  09:10 


 


WBC   (4.5-11.0)  K/mm3


 


Plt Count   (140-440)  K/mm3


 


Seg Neuts % (Manual)   (40.0-70.0)  %


 


Lymphocytes % (Manual)   (13.4-35.0)  %


 


Seg Neutrophils # Man   (1.8-7.7)  K/mm3


 


Sodium  135 L  (137-145)  mmol/L


 


Potassium  3.0 L  (3.6-5.0)  mmol/L


 


Chloride  97.4 L  ()  mmol/L


 


Lactic Acid   (0.7-2.0)  mmol/L


 


AST  115 H  (5-40)  units/L


 


ALT  342 H  (7-56)  units/L


 


Alkaline Phosphatase  335 H  ()  units/L


 


Albumin  3.5 L  (3.9-5)  g/dL


 


Urine WBC (Auto)   (0.0-6.0)  /HPF

## 2021-04-17 LAB
BAND NEUTROPHILS # (MANUAL): 0.1 K/MM3
BUN SERPL-MCNC: 9 MG/DL (ref 7–17)
BUN/CREAT SERPL: 10 %
CALCIUM SERPL-MCNC: 8.2 MG/DL (ref 8.4–10.2)
HCT VFR BLD CALC: 31.6 % (ref 30.3–42.9)
HEMOLYSIS INDEX: 4
HGB BLD-MCNC: 10.3 GM/DL (ref 10.1–14.3)
MCHC RBC AUTO-ENTMCNC: 33 % (ref 30–34)
MCV RBC AUTO: 88 FL (ref 79–97)
MYELOCYTES # (MANUAL): 0 K/MM3
PLATELET # BLD: 89 K/MM3 (ref 140–440)
PROMYELOCYTES # (MANUAL): 0 K/MM3
RBC # BLD AUTO: 3.59 M/MM3 (ref 3.65–5.03)
TOTAL CELLS COUNTED BLD: 100

## 2021-04-17 RX ADMIN — ONDANSETRON PRN MG: 2 INJECTION INTRAMUSCULAR; INTRAVENOUS at 13:13

## 2021-04-17 RX ADMIN — TRIAMTERENE AND HYDROCHLOROTHIAZIDE SCH EACH: 37.5; 25 TABLET ORAL at 10:00

## 2021-04-17 RX ADMIN — VANCOMYCIN HYDROCHLORIDE SCH MLS/HR: 5 INJECTION, POWDER, LYOPHILIZED, FOR SOLUTION INTRAVENOUS at 08:35

## 2021-04-17 RX ADMIN — SODIUM CHLORIDE SCH MLS/HR: 0.9 INJECTION, SOLUTION INTRAVENOUS at 08:28

## 2021-04-17 RX ADMIN — BUTALBITAL, ACETAMINOPHEN, AND CAFFEINE PRN TAB: 50; 325; 40 TABLET ORAL at 14:45

## 2021-04-17 RX ADMIN — ACETAMINOPHEN PRN MG: 325 TABLET ORAL at 16:55

## 2021-04-17 RX ADMIN — MORPHINE SULFATE PRN MG: 2 INJECTION, SOLUTION INTRAMUSCULAR; INTRAVENOUS at 08:24

## 2021-04-17 RX ADMIN — SODIUM CHLORIDE SCH MLS/HR: 0.9 INJECTION, SOLUTION INTRAVENOUS at 17:05

## 2021-04-17 RX ADMIN — VANCOMYCIN HYDROCHLORIDE SCH MLS/HR: 5 INJECTION, POWDER, LYOPHILIZED, FOR SOLUTION INTRAVENOUS at 22:02

## 2021-04-17 RX ADMIN — ONDANSETRON PRN MG: 2 INJECTION INTRAMUSCULAR; INTRAVENOUS at 08:24

## 2021-04-17 RX ADMIN — MORPHINE SULFATE PRN MG: 2 INJECTION, SOLUTION INTRAMUSCULAR; INTRAVENOUS at 13:13

## 2021-04-17 RX ADMIN — Medication SCH ML: at 10:00

## 2021-04-17 RX ADMIN — ACETAMINOPHEN PRN MG: 325 TABLET ORAL at 10:07

## 2021-04-17 RX ADMIN — Medication SCH ML: at 22:06

## 2021-04-17 NOTE — CONSULTATION
History of Present Illness





- Reason for Consult


Consult date: 04/17/21


Bacteremia


Requesting physician: PATRICIO DAVIS





- History of Present Illness


The patient is a 51-year-old female with breast cancer was admitted to the 

hospital with complaints of fever.  She was evaluated in the clinic and was 

noted to have a fever as well as redness around her port site.  Patient was seen

by general surgery and noted to have purulent exudate in the pocket and around 

the port, [id number] has been removed.  Blood cultures turn positive for 

Staph aureus, hence infectious diseases was consulted.  Patient with ongoing 

fevers, T-max of 103.1 F.  Currently, on IV vancomycin. Only complaint is of 

fever. 





Review of Systems: 


General: Fever


HEENT: no new visual disturbance


Respiratory: No cough, sputum, hemoptysis or shortness of breath


Cardiovascular: No chest pain, syncope


Gastrointestinal: No nausea, vomiting or diarrhea


Genitourinary: No dysuria or hematuria


Musculoskeletal: No new or worsening neck pain or back pain 


Neurologic: No headaches, seizures


Hematologic: No easy bruising or bleeding


Endocrine: No night sweats or acute weight loss


Skin: negative for rash, jaundice


Psychiatric: No suicidal or homicidal ideation








Past History


Past Medical History: cancer


Past Surgical History: Other (Port placement and removal)


Social history: , lives with family.  denies: smoking, alcohol abuse, 

prescription drug abuse


Family history: hypertension





Medications and Allergies


                                    Allergies











Allergy/AdvReac Type Severity Reaction Status Date / Time


 


codeine Allergy  nausea and Verified 03/26/21 14:12





   vomiting  


 


Penicillins Allergy  rash, Verified 03/26/21 14:12





   swelling,  





   itching  











                                Home Medications











 Medication  Instructions  Recorded  Confirmed  Last Taken  Type


 


Triamter/Hctz 37.5-25 mg 1 tab PO DAILY 02/18/19 03/30/21 03/30/21 07:00 History


 


HYDROcodone/APAP 5-325 [[place] 1 each PO Q6HR PRN #10 tablet 03/30/21  Unknown Rx





5/325]     











Active Meds: 


Active Medications





Acetaminophen (Acetaminophen 325 Mg Tab)  650 mg PO Q4H PRN


   PRN Reason: Pain MILD(1-3)/Fever >100.5/HA


   Last Admin: 04/17/21 10:07 Dose:  650 mg


   Documented by: 


Acetaminophen/Butalbital/Caffeine (Butalb/Acetaminophen/Caffeine Tab)  1 tab PO 

QID PRN


   PRN Reason: Headache


   Last Admin: 04/16/21 23:47 Dose:  1 tab


   Documented by: 


Albuterol (Albuterol 2.5 Mg/3 Ml Nebu)  2.5 mg IH Q4HRT PRN


   PRN Reason: Shortness Of Breath


Sodium Chloride (Nacl 0.9% 1000 Ml)  1,000 mls @ 125 mls/hr IV AS DIRECT LUCY


   Last Admin: 04/17/21 08:28 Dose:  125 mls/hr


   Documented by: 


Levofloxacin/Dextrose (Levaquin 750mg/150ml)  750 mg in 150 mls @ 100 mls/hr IV 

Q24H LUCY; Protocol


   Last Admin: 04/16/21 18:20 Dose:  100 mls/hr


   Documented by: 


Vancomycin HCl 1,500 mg/ (Sodium Chloride)  530 mls @ 333.333 mls/hr IV Q12H LUCY


Morphine Sulfate (Morphine 2 Mg/1 Ml Inj)  2 mg IV Q4H PRN


   PRN Reason: Pain , Severe (7-10)


   Last Admin: 04/17/21 13:13 Dose:  2 mg


   Documented by: 


Ondansetron HCl (Ondansetron 4 Mg/2 Ml Inj)  4 mg IV Q4H PRN


   PRN Reason: Nausea And Vomiting


   Last Admin: 04/17/21 13:13 Dose:  4 mg


   Documented by: 


Sodium Chloride (Sodium Chloride 0.9% 10 Ml Flush Syringe)  10 ml IV BID [facility]


   Last Admin: 04/17/21 10:00 Dose:  10 ml


   Documented by: 


Sodium Chloride (Sodium Chloride 0.9% 10 Ml Flush Syringe)  10 ml IV PRN PRN


   PRN Reason: LINE FLUSH


Tramadol HCl (Tramadol 50 Mg Tab)  50 mg PO Q4H PRN


   PRN Reason: Pain, Moderate (4-6)


   Last Admin: 04/16/21 17:27 Dose:  50 mg


   Documented by: 


Triamterene/Hydrochlorothiazide (Triamter/Hctz 37.5-25 Mg Tab)  1 each PO QAM 

[facility]


   Last Admin: 04/17/21 10:00 Dose:  1 each


   Documented by: 


Zolpidem Tartrate (Zolpidem 5 Mg Tab)  5 mg PO QHS PRN


   PRN Reason: Sleep


   Last Admin: 04/16/21 23:44 Dose:  5 mg


   Documented by: 











Physical Examination





- Physical Exam


Narrative exam: 





Physical Exam: 


Constitutional: Alert, cooperative. No acute distress


Head, Ears, Nose: Normocephalic, atraumatic. External ears, nose normal


Eyes: Conjunctivae/corneas clear. No icterus. No ptosis.


Neck: Supple, no meningeal signs


Oral: Mask


Cardiovascular: S1, S2 normal. 


Respiratory: Good air entry, clear to auscultation bilaterally


GI: Soft, non-tender; bowel sounds normal. No peritoneal signs


Musculoskeletal: No pedal edema, no cyanosis.  Port site with dressing


Skin: No rash or abscess


Hem/Lymphatic: No palpable cervical or supraclavicular nodes. No lymphangitis


Psych: Mood ok. Affect normal


Neurological: Awake, alert, oriented. No gross abnormality





- Constitutional


Vitals: 


                                   Vital Signs











Temp Pulse Resp BP Pulse Ox


 


 98.4 F   92 H  18   112/77   100 


 


 04/17/21 05:09  04/17/21 05:09  04/17/21 05:09  04/17/21 05:09  04/17/21 05:09








                           Temperature -Last 24 Hours











Temperature                    98.4 F


 


Temperature                    100.1 F


 


Temperature                    103.1 F


 


Temperature                    100.0 F


 


Temperature                    100.1 F

















Results





- Labs


CBC & Chem 7: 


                                 04/17/21 08:31





                                 04/17/21 08:31


Labs: 


                              Abnormal lab results











  04/16/21 04/17/21 04/17/21 Range/Units





  18:55 08:31 08:31 


 


WBC    12.9 H  (4.5-11.0)  K/mm3


 


RBC    3.59 L  (3.65-5.03)  M/mm3


 


Plt Count    89 L  (140-440)  K/mm3


 


Seg Neuts % (Manual)    84.0 H  (40.0-70.0)  %


 


Lymphocytes % (Manual)    12.0 L  (13.4-35.0)  %


 


Seg Neutrophils # Man    10.8 H  (1.8-7.7)  K/mm3


 


Sodium  130 L  134 L   (137-145)  mmol/L


 


Chloride  96.9 L    ()  mmol/L


 


Glucose  105 H    ()  mg/dL


 


Calcium  8.2 L  8.2 L   (8.4-10.2)  mg/dL














Assessment and Plan





Cultures:


4/15/2021 blood culture: Staph aureus


4/15/2021 port catheter tip culture: GNR, CFU >15





A/P:


51-year-old female with breast cancer with:





#Sepsis secondary to Staph aureus bacteremia from infected [id number]: Status

post removal on 4/15/2021. Denies indwelling intravascular prosthetic material.





#Breast cancer: On chemotherapy, has received 1 dose.





#Acute thrombocytopenia: Probably from sepsis.





#Allergies: Real allergy, causes anaphylaxis. 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 

allergic reactions to amoxicillin.





Recs:


-Continue IV vancomycin, target trough 15 to 20 mcg/mL


-Continue with IV levofloxacin for now


-Follow-up cultures


-Repeat blood cultures and TTE ordered


-Will send referral to [facility] for outpatient long acting glycopeptides, that way, 

we can avoid another line





Thang Salomon MD, FACP


[facility] ([facility])


O: [phone]


F: [phone]

## 2021-04-17 NOTE — PROGRESS NOTE
Assessment and Plan





- Patient Problems


(1) Staphylococcus aureus bacteremia with sepsis


Current Visit: Yes   Status: Acute   


Plan to address problem: 


MSSA and Citrobacter 








(2) Breast cancer


Current Visit: Yes   Status: Acute   


Plan to address problem: 


Follow up with oncology for chemo








(3) Infection due to Port-A-Cath


Current Visit: Yes   Status: Acute   


Qualifiers: 


   Encounter type: initial encounter   Qualified Code(s): T80.219A - Unspecified

infection due to central venous catheter, initial encounter   


Plan to address problem: 


Port removed


Port infective


Cath tip positive 








(4) DVT prophylaxis


Current Visit: Yes   Status: Acute   


Plan to address problem: 


o Heparin and GI prophylaxis








Subjective


Date of service: 04/17/21


Principal diagnosis: Staph bacteremia


Interval history: 





50 YO Female with BrCa admitted to directly to [facility] at the request of . 

Patient was seen and evaluated in the office today and was found to have fever 

to 102 F as well as tenderness around her port site.  Patient underwent routine

lab work and was found to have leukocytosis.  The combination of the 

aforementioned symptoms are consistent with sepsis.  The patient was admitted 

directly to the medical floor and initiated on sepsis protocol admission.  

Patient seen and evaluated in her room upon arrival.  Patient knowledges fever, 

fatigue  Patient denies chills, chest pain, palpitations, productive cough, skin

rash, recent ill contact, or known exposure to COVID-19.  No prior admission for

review.  All medication listed at time of admission has been reconciled.


Surgery team consulted.  Patient taken to the operating room and underwent 

discontinuation of port.





4/16


Port removed 


Cultures pending


Started on IV vancomycin ang IV levaquin


ID consult requested





4/17


Cocci in clusters














Objective





- Constitutional


Vitals: 


                               Vital Signs - 12hr











  04/17/21





  05:09


 


Temperature 98.4 F


 


Pulse Rate 92 H


 


Respiratory 18





Rate 


 


Blood Pressure 112/77


 


O2 Sat by Pulse 100





Oximetry 











General appearance: Present: no acute distress, well-nourished





- EENT


Eyes: PERRL, EOM intact


ENT: hearing intact, clear oral mucosa


Ears: bilateral: normal





- Neck


Neck: supple, normal ROM





- Respiratory


Respiratory effort: normal


Respiratory: bilateral: CTA





- Breasts


Breasts: normal





- Cardiovascular


Heart rate: 78


Rhythm: regular


Heart Sounds: Present: S1 & S2.  Absent: gallop, rub


Extremities: pulses intact, No edema, normal color, Full ROM





- Gastrointestinal


General gastrointestinal: Present: soft, non-tender, non-distended, normal bowel

sounds





- Genitourinary


Female genitourinary: normal





- Integumentary


Integumentary: clear, warm, dry





- Musculoskeletal


Musculoskeletal: 1, strength equal bilaterally





- Neurologic


Neurologic: moves all extremities





- Psychiatric


Psychiatric: memory intact, appropriate mood/affect, intact judgment & insight





- Labs


CBC & Chem 7: 


                                 04/19/21 08:23





                                 04/19/21 08:23


Labs: 


                              Abnormal lab results











  04/16/21 04/17/21 04/17/21 Range/Units





  18:55 08:31 08:31 


 


WBC    12.9 H  (4.5-11.0)  K/mm3


 


RBC    3.59 L  (3.65-5.03)  M/mm3


 


Plt Count    89 L  (140-440)  K/mm3


 


Seg Neuts % (Manual)    84.0 H  (40.0-70.0)  %


 


Lymphocytes % (Manual)    12.0 L  (13.4-35.0)  %


 


Seg Neutrophils # Man    10.8 H  (1.8-7.7)  K/mm3


 


Sodium  130 L  134 L   (137-145)  mmol/L


 


Chloride  96.9 L    ()  mmol/L


 


Glucose  105 H    ()  mg/dL


 


Calcium  8.2 L  8.2 L   (8.4-10.2)  mg/dL

## 2021-04-18 RX ADMIN — VANCOMYCIN HYDROCHLORIDE SCH MLS/HR: 5 INJECTION, POWDER, LYOPHILIZED, FOR SOLUTION INTRAVENOUS at 09:01

## 2021-04-18 RX ADMIN — BUTALBITAL, ACETAMINOPHEN, AND CAFFEINE PRN TAB: 50; 325; 40 TABLET ORAL at 17:12

## 2021-04-18 RX ADMIN — MORPHINE SULFATE PRN MG: 2 INJECTION, SOLUTION INTRAMUSCULAR; INTRAVENOUS at 20:37

## 2021-04-18 RX ADMIN — BUTALBITAL, ACETAMINOPHEN, AND CAFFEINE PRN TAB: 50; 325; 40 TABLET ORAL at 09:02

## 2021-04-18 RX ADMIN — ACETAMINOPHEN PRN MG: 325 TABLET ORAL at 23:30

## 2021-04-18 RX ADMIN — Medication SCH ML: at 23:25

## 2021-04-18 RX ADMIN — MORPHINE SULFATE PRN MG: 2 INJECTION, SOLUTION INTRAMUSCULAR; INTRAVENOUS at 11:48

## 2021-04-18 RX ADMIN — TRIAMTERENE AND HYDROCHLOROTHIAZIDE SCH EACH: 37.5; 25 TABLET ORAL at 09:02

## 2021-04-18 RX ADMIN — ONDANSETRON PRN MG: 2 INJECTION INTRAMUSCULAR; INTRAVENOUS at 11:48

## 2021-04-18 RX ADMIN — ONDANSETRON PRN MG: 2 INJECTION INTRAMUSCULAR; INTRAVENOUS at 20:37

## 2021-04-18 RX ADMIN — SODIUM CHLORIDE SCH MLS/HR: 0.9 INJECTION, SOLUTION INTRAVENOUS at 09:02

## 2021-04-18 RX ADMIN — Medication SCH ML: at 09:06

## 2021-04-18 NOTE — PROGRESS NOTE
Assessment and Plan





- Patient Problems


(1) Staphylococcus aureus bacteremia with sepsis


Current Visit: Yes   Status: Acute   


Plan to address problem: 


MSSA and Citrobacter 








(2) Breast cancer


Current Visit: Yes   Status: Acute   


Plan to address problem: 


Follow up with oncology for chemo








(3) Infection due to Port-A-Cath


Current Visit: Yes   Status: Acute   


Qualifiers: 


   Encounter type: initial encounter   Qualified Code(s): T80.219A - Unspecified

infection due to central venous catheter, initial encounter   


Plan to address problem: 


Port removed


Port infective


Cath tip positive 








(4) DVT prophylaxis


Current Visit: Yes   Status: Acute   


Plan to address problem: 


o Heparin and GI prophylaxis








Subjective


Date of service: 04/18/21


Principal diagnosis: Staph bacteremia


Interval history: 





50 YO Female with BrCa admitted to directly to [facility] at the request of . 

Patient was seen and evaluated in the office today and was found to have fever 

to 102 F as well as tenderness around her port site.  Patient underwent routine

lab work and was found to have leukocytosis.  The combination of the 

aforementioned symptoms are consistent with sepsis.  The patient was admitted 

directly to the medical floor and initiated on sepsis protocol admission.  

Patient seen and evaluated in her room upon arrival.  Patient knowledges fever, 

fatigue  Patient denies chills, chest pain, palpitations, productive cough, skin

rash, recent ill contact, or known exposure to COVID-19.  No prior admission for

review.  All medication listed at time of admission has been reconciled.


Surgery team consulted.  Patient taken to the operating room and underwent 

discontinuation of port.





4/16


Port removed 


Cultures pending


Started on IV vancomycin ang IV levaquin


ID consult requested





4/17


Coccinin clusters


Cont IV vanc





4/18


Staph and Citrobacter


MSSA and Citrobacter sensitivity to Levaquin and Vanco


ID consult appreciated














Objective





- Constitutional


Vitals: 


                               Vital Signs - 12hr











  04/18/21 04/18/21 04/18/21





  04:30 10:00 11:53


 


Temperature 98.2 F  99.3 F


 


Pulse Rate 97 H  109 H


 


Respiratory 18  18





Rate   


 


Blood Pressure 121/80  131/86


 


O2 Sat by Pulse 94 98 100





Oximetry   











General appearance: Present: no acute distress, well-nourished





- EENT


Eyes: PERRL, EOM intact


ENT: hearing intact, clear oral mucosa


Ears: bilateral: normal





- Neck


Neck: supple, normal ROM





- Respiratory


Respiratory effort: normal


Respiratory: bilateral: CTA





- Breasts


Breasts: normal





- Cardiovascular


Heart rate: 78


Rhythm: regular


Heart Sounds: Present: S1 & S2.  Absent: gallop, rub


Extremities: pulses intact, No edema, normal color, Full ROM





- Gastrointestinal


General gastrointestinal: Present: soft, non-tender, non-distended, normal bowel

sounds





- Genitourinary


Female genitourinary: normal





- Integumentary


Integumentary: clear, warm, dry





- Musculoskeletal


Musculoskeletal: 1, strength equal bilaterally





- Neurologic


Neurologic: moves all extremities





- Psychiatric


Psychiatric: memory intact, appropriate mood/affect, intact judgment & insight





- Labs


CBC & Chem 7: 


                                 04/19/21 08:23





                                 04/19/21 08:23

## 2021-04-19 LAB
ALBUMIN SERPL-MCNC: 2.6 G/DL (ref 3.9–5)
ALT SERPL-CCNC: 166 UNITS/L (ref 7–56)
BASOPHILS # (AUTO): 0 K/MM3 (ref 0–0.1)
BASOPHILS NFR BLD AUTO: 0.4 % (ref 0–1.8)
BUN SERPL-MCNC: 7 MG/DL (ref 7–17)
BUN/CREAT SERPL: 9 %
CALCIUM SERPL-MCNC: 8.3 MG/DL (ref 8.4–10.2)
EOSINOPHIL # BLD AUTO: 0.1 K/MM3 (ref 0–0.4)
EOSINOPHIL NFR BLD AUTO: 0.6 % (ref 0–4.3)
HCT VFR BLD CALC: 29.2 % (ref 30.3–42.9)
HEMOLYSIS INDEX: 42
HGB BLD-MCNC: 9.8 GM/DL (ref 10.1–14.3)
LYMPHOCYTES # BLD AUTO: 1.4 K/MM3 (ref 1.2–5.4)
LYMPHOCYTES NFR BLD AUTO: 15.1 % (ref 13.4–35)
MCHC RBC AUTO-ENTMCNC: 34 % (ref 30–34)
MCV RBC AUTO: 88 FL (ref 79–97)
MONOCYTES # (AUTO): 0.7 K/MM3 (ref 0–0.8)
MONOCYTES % (AUTO): 6.9 % (ref 0–7.3)
PLATELET # BLD: 97 K/MM3 (ref 140–440)
RBC # BLD AUTO: 3.33 M/MM3 (ref 3.65–5.03)

## 2021-04-19 RX ADMIN — ONDANSETRON PRN MG: 2 INJECTION INTRAMUSCULAR; INTRAVENOUS at 20:54

## 2021-04-19 RX ADMIN — VANCOMYCIN HYDROCHLORIDE SCH MLS/HR: 5 INJECTION, POWDER, LYOPHILIZED, FOR SOLUTION INTRAVENOUS at 17:46

## 2021-04-19 RX ADMIN — VANCOMYCIN HYDROCHLORIDE SCH MLS/HR: 5 INJECTION, POWDER, LYOPHILIZED, FOR SOLUTION INTRAVENOUS at 04:46

## 2021-04-19 RX ADMIN — TRIAMTERENE AND HYDROCHLOROTHIAZIDE SCH EACH: 37.5; 25 TABLET ORAL at 09:35

## 2021-04-19 RX ADMIN — MORPHINE SULFATE PRN MG: 2 INJECTION, SOLUTION INTRAMUSCULAR; INTRAVENOUS at 20:54

## 2021-04-19 RX ADMIN — Medication SCH ML: at 21:00

## 2021-04-19 RX ADMIN — Medication SCH ML: at 14:02

## 2021-04-19 RX ADMIN — BUTALBITAL, ACETAMINOPHEN, AND CAFFEINE PRN TAB: 50; 325; 40 TABLET ORAL at 08:13

## 2021-04-19 NOTE — PROGRESS NOTE
Assessment and Plan





Cultures:


4/15/2021 blood culture: Staph aureus


4/15/2021 port catheter tip culture: Citrobacter koseri, CFU >15


4/17/2021 blood culture: No growth so far





A/P:


51-year-old female with breast cancer with:





#Sepsis secondary to Staph aureus bacteremia from infected [id number]: Status

post removal on 4/15/2021. Denies indwelling intravascular prosthetic material.





#Breast cancer: On chemotherapy, has received 1 dose.





#Acute thrombocytopenia: Probably from sepsis.





#Allergies: Real allergy, causes anaphylaxis. 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 

allergic reactions to amoxicillin.





Recs:


-Continue IV vancomycin, target trough 15 to 20 mcg/mL


-Continue with IV levofloxacin for now; can DC with PO to complete 7 days 750mg 

q24h


-Follow-up cultures


-Approved for Dalvance through [facility]; she prefers this route to avoid anything 

invasive at the present time. 


-OK for DC tomorrow when cultures clear for 48 hours. No need for line on DC. 





ANISHA Starr MD


[facility] ([facility])


O: [phone]


F: [phone]





Subjective


Date of service: 04/19/21


Interval history: 





Afebrile, white count has returned to normal. She is doing well and has no 

complaints at the present time. 





Imaging personally reviewed:


TTE: No evidence of vegetation





Objective





- Exam


Narrative Exam: 





Physical Exam: 


Constitutional: Alert, cooperative. No acute distress


Head, Ears, Nose: Normocephalic, atraumatic. External ears, nose normal


Eyes: Conjunctivae/corneas clear. No icterus. No ptosis.


Neck: Supple, no meningeal signs


Oral: dentition fair, no thrush


Cardiovascular: S1, S2 normal. 


Respiratory: Good air entry, clear to auscultation bilaterally


GI: Soft, non-tender; bowel sounds normal. No peritoneal signs. 


Musculoskeletal: No pedal edema, no cyanosis.


Skin: No rash or abscess


Hem/Lymphatic: No palpable cervical or supraclavicular nodes. No lymphangitis


Psych: Mood ok. Affect normal


Neurological: Awake, alert, oriented. No gross abnormality





- Constitutional


Vitals: 


                                   Vital Signs











Temp Pulse Resp BP Pulse Ox


 


 98.8 F   97 H  18   115/78   96 


 


 04/19/21 04:33  04/19/21 04:33  04/19/21 04:33  04/19/21 04:33  04/19/21 04:33








                           Temperature -Last 24 Hours











Temperature                    98.8 F


 


Temperature                    99.8 F


 


Temperature                    98.1 F

















- Labs


CBC & Chem 7: 


                                 04/19/21 08:23





                                 04/19/21 08:23


Labs: 


                              Abnormal lab results











  04/19/21 04/19/21 Range/Units





  08:23 08:23 


 


RBC  3.33 L   (3.65-5.03)  M/mm3


 


Hgb  9.8 L   (10.1-14.3)  gm/dl


 


Hct  29.2 L   (30.3-42.9)  %


 


Plt Count  97 L   (140-440)  K/mm3


 


Seg Neutrophils %  77.0 H   (40.0-70.0)  %


 


Sodium   135 L  (137-145)  mmol/L


 


Potassium   3.5 L  (3.6-5.0)  mmol/L


 


Carbon Dioxide   21 L  (22-30)  mmol/L


 


Calcium   8.3 L  (8.4-10.2)  mg/dL


 


AST   65 H  (5-40)  units/L


 


ALT   166 H  (7-56)  units/L


 


Alkaline Phosphatase   291 H  ()  units/L


 


Total Protein   5.7 L  (6.3-8.2)  g/dL


 


Albumin   2.6 L  (3.9-5)  g/dL

## 2021-04-19 NOTE — PROGRESS NOTE
Assessment and Plan





- Patient Problems


(1) Staphylococcus aureus bacteremia with sepsis


Current Visit: Yes   Status: Acute   


Plan to address problem: 


MSSA and Citrobacter 


Sensitive to Vanco and Levaquin


Discharge on Dalvance if repeat cultures negative











(2) Infection due to Port-A-Cath


Current Visit: Yes   Status: Acute   


Qualifiers: 


   Encounter type: initial encounter   Qualified Code(s): T80.219A - Unspecified

infection due to central venous catheter, initial encounter   


Plan to address problem: 


Port removed


Port infective


Cath tip positive for staph and Citrobacter








(3) Breast cancer


Current Visit: Yes   Status: Acute   


Plan to address problem: 


Follow up with oncology for chemo








(4) DVT prophylaxis


Current Visit: Yes   Status: Acute   


Plan to address problem: 


o Heparin and GI prophylaxis








Subjective


Date of service: 04/19/21


Principal diagnosis: Staph bacteremia and Citro\bacter sec to port infection


Interval history: 





50 YO Female with BrCa admitted to directly to [facility] at the request of . 

Patient was seen and evaluated in the office today and was found to have fever 

to 102 F as well as tenderness around her port site.  Patient underwent routine

lab work and was found to have leukocytosis.  The combination of the 

aforementioned symptoms are consistent with sepsis.  The patient was admitted 

directly to the medical floor and initiated on sepsis protocol admission.  

Patient seen and evaluated in her room upon arrival.  Patient knowledges fever, 

fatigue  Patient denies chills, chest pain, palpitations, productive cough, skin

rash, recent ill contact, or known exposure to COVID-19.  No prior admission for

review.  All medication listed at time of admission has been reconciled.


Surgery team consulted.  Patient taken to the operating room and underwent 

discontinuation of port.





4/16


Port removed 


Cultures pending


Started on IV vancomycin ang IV levaquin


ID consult requested





4/17


Coccinin clusters


Cont IV vanc





4/18


Staph and Citrobacter


MSSA and Citrobacter sensitivity to Levaquin and Vanco


ID consult appreciated





4/19


If repeat cultures negative patient to be discharged on Dalvance tomorrow








Objective





- Constitutional


Vitals: 


                               Vital Signs - 12hr











  04/19/21





  10:00


 


O2 Sat by Pulse 100





Oximetry 











General appearance: Present: no acute distress, well-nourished





- EENT


Eyes: PERRL, EOM intact


ENT: hearing intact, clear oral mucosa


Ears: bilateral: normal





- Neck


Neck: supple, normal ROM





- Respiratory


Respiratory effort: normal


Respiratory: bilateral: CTA





- Breasts


Breasts: normal





- Cardiovascular


Rhythm: regular


Heart Sounds: Present: S1 & S2.  Absent: gallop, rub


Extremities: pulses intact, No edema, normal color, Full ROM





- Gastrointestinal


General gastrointestinal: Present: soft, non-tender, non-distended, normal bowel

sounds





- Genitourinary


Female genitourinary: normal





- Integumentary


Integumentary: clear, warm, dry





- Musculoskeletal


Musculoskeletal: 1, strength equal bilaterally





- Neurologic


Neurologic: moves all extremities





- Psychiatric


Psychiatric: memory intact, appropriate mood/affect, intact judgment & insight





- Labs


CBC & Chem 7: 


                                 04/19/21 08:23





                                 04/19/21 08:23


Labs: 


                              Abnormal lab results











  04/19/21 04/19/21 Range/Units





  08:23 08:23 


 


RBC  3.33 L   (3.65-5.03)  M/mm3


 


Hgb  9.8 L   (10.1-14.3)  gm/dl


 


Hct  29.2 L   (30.3-42.9)  %


 


Plt Count  97 L   (140-440)  K/mm3


 


Seg Neutrophils %  77.0 H   (40.0-70.0)  %


 


Sodium   135 L  (137-145)  mmol/L


 


Potassium   3.5 L  (3.6-5.0)  mmol/L


 


Carbon Dioxide   21 L  (22-30)  mmol/L


 


Calcium   8.3 L  (8.4-10.2)  mg/dL


 


AST   65 H  (5-40)  units/L


 


ALT   166 H  (7-56)  units/L


 


Alkaline Phosphatase   291 H  ()  units/L


 


Total Protein   5.7 L  (6.3-8.2)  g/dL


 


Albumin   2.6 L  (3.9-5)  g/dL

## 2021-04-19 NOTE — PROGRESS NOTE
Assessment and Plan





52 yo F s/p removal of infected left sided port, POD 4





1. sepsis 2/2 infected port


2. bacteremia


3. breast cancer





Pt stable. Afebrile. WBC normal today,





Plan:


1. abx per ID


2. L chest wall wound - dressing change every other day with calcium alginate as

per orders. Pt states she can do her own dressing changes after dc


3. Reg diet with protein supplements


4. Ok to dc when cleared from ID standpoint.





Thank you for this consultation. Please call with any questions or concerns.





Evaluation and treatment of this patient was during the time of the national and

state emergency arising from COVID19 coronavirus pandemic. Treatment and 

procedures performed meet the current and available best practice and guidelines

for patient during the COVID pandemic.





Subjective


Date of service: 04/19/21


Narrative: 





Pt seen and examined. Afebrile x24 hours. c/o HA that is relieved with fiorcet. 

No pain at wound site.





Objective


                               Vital Signs - 12hr











  04/19/21





  04:33


 


Temperature 98.8 F


 


Pulse Rate 97 H


 


Respiratory 18





Rate 


 


Blood Pressure 115/78


 


O2 Sat by Pulse 96





Oximetry 














- General physical appearance


Narrative Exam: 





Gen.: Awake, alert, oriented 3.  No apparent distress


CV/Chest: S1, S2 present. L chest wall dressing removed and one piece of mesalt 

packing removed from wound. Wound is clean without drainage or odor. Packed with

one piece of calcium alginate and covered with foam dressing. No surrounding 

erythema, induration, fluctuance. No TTP.


Respiratory: No audible wheezes


Extremities: No clubbing, cyanosis, edema





- Labs





                                 04/19/21 08:23





                                 04/19/21 08:23


                                 Diabetes panel











  04/19/21 Range/Units





  08:23 


 


Sodium  135 L  (137-145)  mmol/L


 


Potassium  3.5 L  (3.6-5.0)  mmol/L


 


Chloride  102.3  ()  mmol/L


 


Carbon Dioxide  21 L  (22-30)  mmol/L


 


BUN  7  (7-17)  mg/dL


 


Creatinine  0.8  (0.6-1.2)  mg/dL


 


Glucose  83  ()  mg/dL


 


Calcium  8.3 L  (8.4-10.2)  mg/dL


 


AST  65 H  (5-40)  units/L


 


ALT  166 H  (7-56)  units/L


 


Alkaline Phosphatase  291 H  ()  units/L


 


Total Protein  5.7 L  (6.3-8.2)  g/dL


 


Albumin  2.6 L  (3.9-5)  g/dL








                                  Calcium panel











  04/19/21 Range/Units





  08:23 


 


Calcium  8.3 L  (8.4-10.2)  mg/dL


 


Albumin  2.6 L  (3.9-5)  g/dL








                                 Pituitary panel











  04/19/21 Range/Units





  08:23 


 


Sodium  135 L  (137-145)  mmol/L


 


Potassium  3.5 L  (3.6-5.0)  mmol/L


 


Chloride  102.3  ()  mmol/L


 


Carbon Dioxide  21 L  (22-30)  mmol/L


 


BUN  7  (7-17)  mg/dL


 


Creatinine  0.8  (0.6-1.2)  mg/dL


 


Glucose  83  ()  mg/dL


 


Calcium  8.3 L  (8.4-10.2)  mg/dL








                                  Adrenal panel











  04/19/21 Range/Units





  08:23 


 


Sodium  135 L  (137-145)  mmol/L


 


Potassium  3.5 L  (3.6-5.0)  mmol/L


 


Chloride  102.3  ()  mmol/L


 


Carbon Dioxide  21 L  (22-30)  mmol/L


 


BUN  7  (7-17)  mg/dL


 


Creatinine  0.8  (0.6-1.2)  mg/dL


 


Glucose  83  ()  mg/dL


 


Calcium  8.3 L  (8.4-10.2)  mg/dL


 


Total Bilirubin  0.30  (0.1-1.2)  mg/dL


 


AST  65 H  (5-40)  units/L


 


ALT  166 H  (7-56)  units/L


 


Alkaline Phosphatase  291 H  ()  units/L


 


Total Protein  5.7 L  (6.3-8.2)  g/dL


 


Albumin  2.6 L  (3.9-5)  g/dL

## 2021-04-20 VITALS — SYSTOLIC BLOOD PRESSURE: 120 MMHG | DIASTOLIC BLOOD PRESSURE: 74 MMHG

## 2021-04-20 RX ADMIN — BUTALBITAL, ACETAMINOPHEN, AND CAFFEINE PRN TAB: 50; 325; 40 TABLET ORAL at 04:58

## 2021-04-20 RX ADMIN — BUTALBITAL, ACETAMINOPHEN, AND CAFFEINE PRN TAB: 50; 325; 40 TABLET ORAL at 13:09

## 2021-04-20 RX ADMIN — ONDANSETRON PRN MG: 2 INJECTION INTRAMUSCULAR; INTRAVENOUS at 09:34

## 2021-04-20 RX ADMIN — MORPHINE SULFATE PRN MG: 2 INJECTION, SOLUTION INTRAMUSCULAR; INTRAVENOUS at 09:34

## 2021-04-20 RX ADMIN — Medication SCH ML: at 09:34

## 2021-04-20 RX ADMIN — TRIAMTERENE AND HYDROCHLOROTHIAZIDE SCH EACH: 37.5; 25 TABLET ORAL at 09:34

## 2021-04-20 RX ADMIN — VANCOMYCIN HYDROCHLORIDE SCH MLS/HR: 5 INJECTION, POWDER, LYOPHILIZED, FOR SOLUTION INTRAVENOUS at 05:46

## 2021-04-20 NOTE — DISCHARGE SUMMARY
Providers





- Providers


Date of Admission: 


04/15/21 16:43





Date of discharge: 04/20/21


Attending physician: 


AMY J KOCHERLA





                                        





04/15/21 16:28


Consult to Physician [CONS] Routine 


   Comment: 


   Consulting Provider: TEO VALADEZ


   Physician Instructions: 


   Reason For Exam: port infection





04/16/21 17:58


Consult to Physician [CONS] Routine 


   Comment: 


   Consulting Provider: THANH KAUR


   Physician Instructions: 


   Reason For Exam: Staph bacteremia











Primary care physician: 


SOY AGUSTIN








Hospitalization


Disposition: DC-01 TO HOME OR SELFCARE


Time spent for discharge: 35 min





Core Measure Documentation





- Palliative Care


Palliative Care/ Comfort Measures: Not Applicable





- Core Measures


Any of the following diagnoses?: none





Exam





- Constitutional


Vitals: 


                                        











Temp Pulse Resp BP Pulse Ox


 


 99.2 F   112 H  18   120/74   99 


 


 04/20/21 12:34  04/20/21 12:34  04/20/21 12:34  04/20/21 12:34  04/20/21 12:34











General appearance: Present: no acute distress, well-nourished





- EENT


Eyes: Present: PERRL, EOM intact





- Neck


Neck: Present: supple, normal ROM





- Respiratory


Respiratory effort: normal


Respiratory: bilateral: diminished, negative: rales, rhonchi, wheezing





- Cardiovascular


Rhythm: regular


Heart Sounds: Present: S1 & S2





- Extremities


Extremities: no ischemia, No edema





- Abdominal


General gastrointestinal: Present: soft, non-tender, non-distended, normal bowel

 sounds





- Integumentary


Integumentary: Present: clear, warm





- Musculoskeletal


Musculoskeletal: strength equal bilaterally





- Psychiatric


Psychiatric: appropriate mood/affect, cooperative





- Neurologic


Neurologic: CNII-XII intact, moves all extremities





Plan


Activity: advance as tolerated


Diet: low salt


Additional Instructions: Advised to follow with ID[Dr. Kaur/Dr. Salomon]  per 

schedule.  Dalvance therapy at ID office[[facility]].  If you have worsening symptoms 

contact MD or go to emergency room


Follow up with: 


SOY AGUSTIN JR, MD [Primary Care Provider] - 7 Days


TRIP CARDENAS MD [Staff Physician] - 7 Days


TEO VALADEZ DO [Staff Physician] - 7 Days


Prescriptions: 


Butalb/Acetamin/Caff -40 [Fioricet -40] 1 tab PO QID PRN #30 tablet


 PRN Reason: Headache


levoFLOXacin [Levaquin] 750 mg PO QDAY #3 tablet


traMADoL [Ultram 50 MG tab] 50 mg PO Q4H PRN #20 tablet


 PRN Reason: Pain, Moderate (4-6)

## 2021-04-20 NOTE — PROGRESS NOTE
Assessment and Plan





Cultures:


4/15/2021 blood culture: Staph aureus


4/15/2021 port catheter tip culture: Citrobacter koseri, CFU >15


4/17/2021 blood culture: No growth so far





A/P:


51-year-old female with breast cancer with:





#Sepsis secondary to Staph aureus bacteremia from infected [id number]: Status

post removal on 4/15/2021. Denies indwelling intravascular prosthetic material.





#Breast cancer: On chemotherapy, has received 1 dose.





#Acute thrombocytopenia: Probably from sepsis.





#Allergies: Real allergy, causes anaphylaxis. 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 

allergic reactions to amoxicillin.





Recs:


-Continue IV vancomycin, target trough 15 to 20 mcg/mL


-Continue with IV levofloxacin for now; can DC with PO to complete 7 days 750mg 

q24h


-Follow-up cultures; negative at 48 hours. 


-Approved for Dalvance through [facility]; she prefers this route to avoid anything 

invasive at the present time. 


-OK for DC today. 





ANISHA Starr MD


[facility] ([facility])


O: [phone]


F: [phone]





Subjective


Date of service: 04/20/21


Principal diagnosis: Staph bacteremia


Interval history: 





Afebrile, normal white count. Cultures remain negative so far. 





Objective





- Exam


Narrative Exam: 





Physical Exam: 


Constitutional: Alert, cooperative. No acute distress


Head, Ears, Nose: Normocephalic, atraumatic. External ears, nose normal


Eyes: Conjunctivae/corneas clear. No icterus. No ptosis.


Neck: Supple, no meningeal signs


Oral: dentition fair, no thrush


Cardiovascular: S1, S2 normal. 


Respiratory: Good air entry, clear to auscultation bilaterally


GI: Soft, non-tender; bowel sounds normal. No peritoneal signs. 


Musculoskeletal: No pedal edema, no cyanosis.


Skin: No rash or abscess


Hem/Lymphatic: No palpable cervical or supraclavicular nodes. No lymphangitis


Psych: Mood ok. Affect normal


Neurological: Awake, alert, oriented. No gross abnormality





- Constitutional


Vitals: 


                                   Vital Signs











Temp Pulse Resp BP Pulse Ox


 


 98.4 F   86   17   122/77   100 


 


 04/20/21 05:35  04/20/21 05:35  04/20/21 05:58  04/20/21 05:35  04/20/21 05:35








                           Temperature -Last 24 Hours











Temperature                    98.4 F


 


Temperature                    98.3 F


 


Temperature                    99.1 F


 


Temperature                    98.7 F

















- Labs


CBC & Chem 7: 


                                 04/19/21 08:23





                                 04/19/21 08:23

## 2021-04-22 NOTE — ELECTROCARDIOGRAPH REPORT
[facility], [place]

                                       

Test Date:    2021               Test Time:    22:44:01

Pat Name:     LOLY PACE            Department:   

Patient ID:   [id number]          Room:         A381 1

Gender:       F                        Technician:   SAE

:          1970               Requested By: JOSE MANUEL DORMAN

Order Number: [id number]              Reading MD:   Raúl Payton

                                 Measurements

Intervals                              Axis          

Rate:         113                      P:            59

PA:           143                      QRS:          35

QRSD:         82                       T:            37

QT:           309                                    

QTc:          424                                    

                           Interpretive Statements

Sinus tachycardia

No previous ECG available for comparison

Electronically Signed On 2021 9:26:35 EDT by Raúl Payton

## 2021-05-19 ENCOUNTER — HOSPITAL ENCOUNTER (OUTPATIENT)
Dept: HOSPITAL 5 - XRAY | Age: 51
Discharge: HOME | End: 2021-05-19
Attending: INTERNAL MEDICINE
Payer: COMMERCIAL

## 2021-05-19 DIAGNOSIS — D64.9: ICD-10-CM

## 2021-05-19 DIAGNOSIS — C50.111: ICD-10-CM

## 2021-05-19 DIAGNOSIS — I10: ICD-10-CM

## 2021-05-19 DIAGNOSIS — M48.061: Primary | ICD-10-CM

## 2021-05-19 DIAGNOSIS — D70.9: ICD-10-CM

## 2021-05-19 DIAGNOSIS — T81.49XA: ICD-10-CM

## 2021-05-19 DIAGNOSIS — Z51.11: ICD-10-CM

## 2021-05-19 DIAGNOSIS — B37.3: ICD-10-CM

## 2021-05-19 PROCEDURE — 72110 X-RAY EXAM L-2 SPINE 4/>VWS: CPT

## 2021-05-19 NOTE — XRAY REPORT
LUMBOSACRAL SPINE 5 VIEWS



INDICATION:  BACK PAIN.



COMPARISON: None.



IMPRESSION:  Normal alignment.  Moderate disc space narrowing is identified at L3-4. The remaining di
sc spaces are unremarkable. Minimal facet arthropathy is suspected at the lowest 3 levels. No evidenc
e for neural foraminal narrowing on the oblique images. The sacrum and SI joints are unremarkable.  N
o acute osseous or soft tissue abnormality.    



Signer Name: Lakhwinder Sparrow Jr, MD 

Signed: 5/19/2021 3:43 PM

Workstation Name: [id number]

## 2022-03-28 NOTE — SHORT STAY SUMMARY
Short Stay Documentation


Date of service: 22


Narrative H&P: 


Past Pregnancy History 


   :      3


   Term Births:   1


   Premature Births:   1


   Living Children:   2


   Spont. Ab:      1





Pregnancy # 1


   Delivery type:     





Pregnancy # 2


   Delivery type:     





Pregnancy # 3


   Delivery type:     SAB








GYN History 


Operations: C-sectionx2


Tubal Ligation


UFE


Laparoscopic Hysterectomy (2014)


Left ovarian cystectomy lsc (2014)


Breast Biopsy:  (2020) (R) malignancy


Mastectomy ()(R)





Abnormal PAP: negative





Infection History 


HIV Risk Eval: no


Hep B Immunized: no


TB exposure: no


Personal hx. of genital herpes: no


Partner hx. of genital herpes: no


Rash/viral illness since LMP: no


Hx of STD: None





Active Medications (reviewed today):


gabapentin 100 mg capsule (gabapentin) TAKE 1 CAPSULE BY MOUTH AT BEDTIME


triamterene-hydrochlorothiazid 37.5-25 mg tablet (triamterene-hydrochlorothiazi

d) 


atorvastatin 20 mg tablet (atorvastatin) 


anastrozole 1 mg tablet (anastrozole) TAKE 1 TABLET BY MOUTH ONCE DAILY


loratadine 10 mg tablet (loratadine) 


Vitamin D3 25 mcg (1,000 unit) tablet (cholecalciferol (vitamin d3)) 


turmeric-turmeric root extract unspecified unspecified (turmeric-turmeric root 

extract) 


Cinnamon 500 mg capsule (cinnamon bark) 


Fish-Flax-Borage Oil [phone] mg capsule (fish,saf,flx,brg oils-o3,6,9#2) 


fluoxetine 20 mg tablet (fluoxetine) TAKE 1 TABLET BY MOUTH ONCE DAILY AS 

DIRECTED


folic acid 1 mg tablet (folic acid) TAKE 1 TABLET BY MOUTH ONCE DAILY





Current Allergies (reviewed today):


PENICILLIN (Critical)


CODEINE (Critical)








Past Medical History:


   Reviewed and updated today:


      Fibrocystic changes breast,adenofibroma


      PMS


      BRCA testing negative


      Breast Cancer ()


      Hypertension


      Hyperlipidemia


      Elevated LFT's


      Diabetes





Past Surgical History:


   Reviewed history from 2021 and no changes required:


      C-sectionx2


      Tubal Ligation


      UFE


      Laparoscopic Hysterectomy (2014)


      Left ovarian cystectomy lsc (2014)


      Breast Biopsy:  (2020) (R) malignancy


      Mastectomy ()(R)











Family History Summary: 


   Reviewed history Last on 2021 and no changes required:2022


Aunt - Has Family History of Brain Cancer - Paternal "blastmoa" - Entered On: 

2021


PGF - Has Family History of Prostate Cancer - Entered On: 2021


Father - Has Family History of Prostate Cancer - Entered On: 2021


Uncle - Has Family History of Stomach Cancer - Maternal - Entered On: 2021


MGF - Has Family History of Lung Cancer - Entered On: 2021





General Comments - FH:


No Family History of Breast Cancer


No Family History of Colon Cancer


No Family History of Ovarian Cancer








Social History:


   Reviewed history from 2014 and no changes required:


      Patient is 


      [employer]


      


      Smoking History:


      Patient has never smoked.








Risk Factors: 


Smoked Tobacco Use:  Never smoker


Smokeless Tobacco Use:  Never


Passive Smoke Exposure:  no


HIV High Risk Behavior:  no


Exercise:  yes


   Times/wk:  2


   Type of Exercise:  walking & dancing


Seatbelt Use:  100 %





Alcohol Use:  yes


   Type:  wine


   Drinks per day:  <1





Drug Use:  no





Previous Tobacco Use: Signed On - 2021


Smoked Tobacco Use:  Never smoker


Smokeless Tobacco Use:  Never


Passive Smoke Exposure:  no


HIV High Risk Behavior:  no


Caffeine Use:  2 drinks per day


Exercise:  no


Seatbelt Use:  100 %


Alcohol Use:  yes


   Type:  wine


   Drinks per day:  <1





Drug Use:  no














Physical Exam 


Appearance: well developed, well nourished, no acute distress





Other Exams 


Lungs: no rales, rhonchi, or wheezes


Heart: S1, S2, no murmur, rub, or gallop








Impression & Recommendations:





Problem # 1:  Breast cancer (ICD-174.9) ([id number]-X14.919)


This perimenopausal breast cancer patient presents for (B) oopherctomy as part 

of her breast cancer treatment and to reduce her risk for ovarian/peritoneal 

malignancy. 


Her updated medication list for this problem includes:


   Turmeric-turmeric Root Extract Unspecified Unspecified (Turmeric-turmeric 

root extract)


   Cinnamon 500 Mg Capsule (Cinnamon bark)


   Fish-flax-borage Oil [phone] Mg Capsule (Fish,saf,flx,brg oils-

o3,6,9#2)





Orders:


Ofc Vst Est 28141 (CPT-05404)


Risks and benefits of robot assisted, conventional laparoscopy and laparotomy 

approaches discussed.She desires to proceed with robot assisted  laparoscopic 

bilateral oophorectomy. Indications for aborting the laparoscopy approach 

explained. The risks and alternatives for this surgery were reviewed with the 

patient. She was informed of possible bleeding, infection, injury to bowel, 

bladder, ureters or other adjacent organs. The patient was instructed/informed 

the following:


   The normal length of hospital stay for this procedure.


   Nothing to eat or drink after midnight the evening prior to surgery. 


   Clear liquids the day before surgery.  


   Fleets enema the day prior to surgery. 


Pre-op instruction sheets given. Wound care instructions given. Infection 

precautions reviewed, patient to call for any signs or symptoms of infection. 

The usual discomforts associated with this procedure were detailed. Proper use 

of pain medicines was reviewed. Patient was given ample opportunity to have all 

her questions answered before signing informed consent. Consent reviewed and 

signed





Medications Added to Medication List This Visit:


1)  Lupron Depot 3.75 Mg Syringe Kit (Leuprolide)


2)  Gabapentin 100 Mg Capsule (Gabapentin) .... Take 1 capsule by mouth at 

bedtime


3)  Triamterene-hydrochlorothiazid 37.5-25 Mg Tablet (Triamterene-

hydrochlorothiazid)


4)  Atorvastatin 20 Mg Tablet (Atorvastatin)


5)  Anastrozole 1 Mg Tablet (Anastrozole) .... Take 1 tablet by mouth once daily


6)  Loratadine 10 Mg Tablet (Loratadine)


7)  Vitamin D3 25 Mcg (1,000 Unit) Tablet (Cholecalciferol (vitamin d3))


8)  Turmeric-turmeric Root Extract Unspecified Unspecified (Turmeric-turmeric 

root extract)





Patient has been reassessed/reevaluated/re-examined. H&P has been reviewed. No 

interval changes.








- Allergies and Medications


Current Medications: 


                                    Allergies





codeine Allergy (Verified 22 17:18)


   nausea and vomiting


Penicillins Allergy (Verified 22 17:18)


   rash, swelling, itching





                                Home Medications











 Medication  Instructions  Recorded  Confirmed  Last Taken  Type


 


Triamter/Hctz 37.5-25 mg 1 tab PO DAILY 19 07:00 History


 


Anastrozole [Arimidex] 1 mg PO DAILY 22 Unknown History


 


AtorvaSTATin [Lipitor] 20 mg PO QHS 22 Unknown History


 


Folic Acid 1 mg PO DAILY 22 Unknown History


 


Gabapentin [Neurontin] 100 mg PO HS 22 Unknown History


 


Leuprolide Acetate (Nf) [Lupron 7.5 mg IM QMONTH 22 Unknown 

History





Depot (Month) (Nf)]     














- Brief post op/procedure progress note


Date of procedure: 22


Pre-op diagnosis: Breast cancer


Post-op diagnosis: same


Procedure: 





RABSO





Anesthesia: GETA


Findings: 





grossly normal (B) ovaries and fallopian tubes


Surgeon: ARGELIA DAVIS ([facility])


Estimated blood loss: minimal


Pathology: list (right and left tubes and ovaries)


Specimen disposition: to lab


Condition: stable





- Hospital course


Hospital course: 





Normal





- Disposition


Condition at discharge: Good


Disposition: 01 HOME / SELF CARE / HOMELESS





- Discharge Diagnoses


(1) Breast neoplasm, Tx


Status: Acute   Comment: BSO for breat cancer treatment    





(2) Breast cancer


Status: Chronic   





Short Stay Discharge Plan


Activity: other (No sex, ambulate ~1mile on your property daily, void 

frequently, )


Weight Bearing Status: Full Weight Bearing


Diet: low fat, low cholesterol, low salt, diabetic


Wound: open to air, keep clean and dry


Special Instructions: no heavy lifting (greater than 25lbs)


Follow up with: 


SOY AGUSTIN JR, MD [Primary Care Provider] - 7 Days


ARGELIA DAVIS MD [Staff Physician] -  (As scheduled)


Prescriptions: 


FLUoxetine [PROzac] 10 mg PO QDAY #90 tablet


traMADoL [Ultram 50 MG tab] 50 mg PO Q6HR PRN #10 tablet


 PRN Reason: Pain

## 2022-03-29 ENCOUNTER — HOSPITAL ENCOUNTER (OUTPATIENT)
Dept: HOSPITAL 5 - OR | Age: 52
Discharge: HOME | End: 2022-03-29
Attending: OBSTETRICS & GYNECOLOGY
Payer: COMMERCIAL

## 2022-03-29 VITALS — SYSTOLIC BLOOD PRESSURE: 103 MMHG | DIASTOLIC BLOOD PRESSURE: 64 MMHG

## 2022-03-29 DIAGNOSIS — Z98.891: ICD-10-CM

## 2022-03-29 DIAGNOSIS — Z72.89: ICD-10-CM

## 2022-03-29 DIAGNOSIS — Z20.822: ICD-10-CM

## 2022-03-29 DIAGNOSIS — C50.919: Primary | ICD-10-CM

## 2022-03-29 DIAGNOSIS — Z98.51: ICD-10-CM

## 2022-03-29 DIAGNOSIS — F41.9: ICD-10-CM

## 2022-03-29 DIAGNOSIS — E78.00: ICD-10-CM

## 2022-03-29 DIAGNOSIS — I10: ICD-10-CM

## 2022-03-29 DIAGNOSIS — Z98.890: ICD-10-CM

## 2022-03-29 DIAGNOSIS — Z88.0: ICD-10-CM

## 2022-03-29 DIAGNOSIS — Z88.5: ICD-10-CM

## 2022-03-29 DIAGNOSIS — K21.9: ICD-10-CM

## 2022-03-29 DIAGNOSIS — Z90.710: ICD-10-CM

## 2022-03-29 PROCEDURE — 58661 LAPAROSCOPY REMOVE ADNEXA: CPT

## 2022-03-29 PROCEDURE — 88305 TISSUE EXAM BY PATHOLOGIST: CPT

## 2022-03-29 PROCEDURE — U0003 INFECTIOUS AGENT DETECTION BY NUCLEIC ACID (DNA OR RNA); SEVERE ACUTE RESPIRATORY SYNDROME CORONAVIRUS 2 (SARS-COV-2) (CORONAVIRUS DISEASE [COVID-19]), AMPLIFIED PROBE TECHNIQUE, MAKING USE OF HIGH THROUGHPUT TECHNOLOGIES AS DESCRIBED BY CMS-2020-01-R: HCPCS

## 2022-03-29 RX ADMIN — HYDROMORPHONE HYDROCHLORIDE PRN MG: 1 INJECTION, SOLUTION INTRAMUSCULAR; INTRAVENOUS; SUBCUTANEOUS at 09:48

## 2022-03-29 RX ADMIN — HYDROMORPHONE HYDROCHLORIDE PRN MG: 1 INJECTION, SOLUTION INTRAMUSCULAR; INTRAVENOUS; SUBCUTANEOUS at 09:58

## 2022-03-29 RX ADMIN — GABAPENTIN NR MG: 300 CAPSULE ORAL at 06:25

## 2022-03-29 RX ADMIN — GABAPENTIN NR MG: 300 CAPSULE ORAL at 11:58

## 2022-03-29 NOTE — ANESTHESIA CONSULTATION
Anesthesia Consult and Med Hx


Date of service: 03/29/22





- Airway


Anesthetic Teeth Evaluation: Good


ROM Head & Neck: Adequate


Mental/Hyoid Distance: Adequate


Mallampati Class: Class III


Intubation Access Assessment: Possibly Difficult (previous easy intubation w/ 

MAC 3)





- Pre-Operative Health Status


ASA Pre-Surgery Classification: ASA2


Proposed Anesthetic Plan: General





- Pulmonary


Hx Smoking: No


Hx Respiratory Symptoms: No





- Cardiovascular System


Hx Hypertension: Yes


Hx Heart Attack/AMI: No


Hx Percutaneous Transluminal Coronary Angioplasty (PTCA): No





- Central Nervous System


CVA: No





- Endocrine


Hx Renal Disease: No


Hx Liver Disease: No


Hx Insulin Dependent Diabetes: No


Hx Non-Insulin Dependent Diabetes: No


Hx Thyroid Disease: No





- Other Systems


Hx Cancer: Yes (breast ca s/p chemo (8mos ago) and xrt)





- Additional Comments


Anesthesia Medical History Comments: No hx anesthetic complications.

## 2022-03-29 NOTE — OPERATIVE REPORT
Operative Report


Operative Report: 


Date of operation:            3/29/2022





Pre-operative diagnosis:      1.  Breast cancer requiring removal of the both 

tubes and ovaries to optimize treatment








Post-operative diagnosis:    1. Breast cancer requiring removal of the both 

tubes and ovaries to optimize treatment








Procedure name(s):            1.  Robotic assisted laparoscopic bilateral 

salpingo-oophorectomy


 


Surgeon:       Tala Arevalo MD





Assistant:       DEMETRIUS Alas





Anesthesia:       General endotracheal anesthesia





Anesthesiologist:   Dr. Mata





EBL:       Minimal





Urine output:       20 mL of clear yellow urine 





Findings:                                       Grossly normal uterus tubes and 

ovaries.  Vaginal atrophy





Procedure:       Patient was taken to the OR and placed in the supine position. 

General anesthesia was induced and an oral gastric tube was placed.  Her neck 

and head were placed on foam support.  Foam eye protection with goggles were s

ecured in place.  Then foam face protection was placed and secured.  Foam 

shoulder pads were then positioned on her shoulders for Trendelenburg 

positioning.  She was then placed in dorsolithotomy position.  Exam under 

anesthesia as above.  The abdomen and vagina were then prepped and draped in the

usual sterile fashion.  Timeout was performed.  A Alex catheter was inserted 

into the bladder with drainage of clear yellow urine.   A moist laparotomy 

sponge was placed in the vagina.  Sterile gloves were placed and attention was 

turned to the abdomen.  A 10 mm midline vertical supraumbilical incision was 

made approximately through her previous incision.    A 10-12 mm trocar with the 

laparoscope and camera attached was introduced through this incision under 

direct visualization.  The abdomen was insufflated.  No obvious bowel, bladder, 

ureteral, or major vascular injury was noted.  The patient was then placed in 

steep Trendelenburg position and the following trochars were placed under direct

visualization: 8 mm robotic trochars were placed through incisions made in the 

previous bilateral midclavicular lower abdominal region approximately 10 cm 

lateral to the midline incision, and a 10 mm trocar was placed through the 

previous incision made in the right lower lateral pelvis.  The 10 mm laparoscope

was then replaced by a 5 mm laparoscope that was placed through the 10 

millimeter lateral trocar.  The midline 10 mm trocar was then removed and the 

Jonathan Glaser fascial closure device was placed through the incision and a 0 

Vicryl was placed through the fascia.  The trocar was then replaced.  Then the 

10 mm lateral trocar was then removed and the Jonathan Glaser fascial closure 

device was placed through the incision and a 0 Vicryl was placed through the 

fascia.  The 10 mm trocar was reintroduced.  Once the trochars were in the 

appropriate positions,  the  da Tiffanie robot system was engaged.  The EndoShears 

and bipolar device was placed through the 8 mm trochars and positioned then 

attention was turned to the console.  The left ovary and tube were elevated, the

course of the ureter was visualized to be away from the operative field, and the

infundibulopelvic ligament was grasped cauterized and incised in a serial 

manner.  The tube and ovary were then placed in Endo Catch bag and removed 

through the the right lateral 10 mm trocar without difficulty.  Attention was 

turned to the right tube and ovary.  The course of the ureter was noted to be 

away from the operative field.  The ovary and tube were elevated and the 

infundibulopelvic ligament was clamped cauterized and incised and released from 

the pelvic sidewall.  The right tube and ovary were placed in the Endo Catch bag

and removed through the 10 mm lateral trocar without difficulty.  Attention was 

turned back to the adnexa, hemostasis was noted bilaterally.  No bowel bladder 

ureteral or major vascular injury was noted in both adnexa.  The CO2 was 

released under direct visualization to ensure hemostasis.  Hemostasis was noted.

 At which point the procedure was ended.  The 8 mm trochars removed under direct

visualization hemostasis was noted.  The CO2 was then released.  The 10 mm 

trocars were removed.  The fascia of both incisions were approximated with the 

previously placed 0 Vicryl.  The incisions were infused with half percent 

Marcaine without epinephrine mixed with Kenalog per patient request.  The 

incisions were reapproximated using 4-0 Vicryl in a subcuticular manner.  

Hemostasis was noted.





Attention was turned to the vagina where slight bleeding was noted from the 

vagina.  No obvious evidence of laceration was noted.  The vaginal cuff was 

intact.  Monsel's was applied to ensure hemostasis.  Hemostasis was noted.  

Drainage of clear yellow urine into the Alex catheter was noted.  Alex 

catheter was removed.





Patient was taken to recovery room in stable condition.